# Patient Record
Sex: MALE | Race: WHITE | Employment: UNEMPLOYED | URBAN - METROPOLITAN AREA
[De-identification: names, ages, dates, MRNs, and addresses within clinical notes are randomized per-mention and may not be internally consistent; named-entity substitution may affect disease eponyms.]

---

## 2022-04-13 ENCOUNTER — APPOINTMENT (OUTPATIENT)
Dept: CT IMAGING | Age: 32
End: 2022-04-13
Payer: COMMERCIAL

## 2022-04-13 ENCOUNTER — HOSPITAL ENCOUNTER (EMERGENCY)
Age: 32
Discharge: HOME | End: 2022-04-13
Attending: EMERGENCY MEDICINE
Payer: COMMERCIAL

## 2022-04-13 ENCOUNTER — APPOINTMENT (OUTPATIENT)
Dept: GENERAL RADIOLOGY | Age: 32
End: 2022-04-13
Payer: COMMERCIAL

## 2022-04-13 VITALS
HEIGHT: 67 IN | RESPIRATION RATE: 20 BRPM | OXYGEN SATURATION: 98 % | WEIGHT: 175 LBS | DIASTOLIC BLOOD PRESSURE: 64 MMHG | BODY MASS INDEX: 27.47 KG/M2 | SYSTOLIC BLOOD PRESSURE: 123 MMHG | HEART RATE: 55 BPM | TEMPERATURE: 98.3 F

## 2022-04-13 DIAGNOSIS — G40.919 BREAKTHROUGH SEIZURE (HCC): ICD-10-CM

## 2022-04-13 DIAGNOSIS — R56.9 SEIZURE (HCC): Primary | ICD-10-CM

## 2022-04-13 LAB
ALBUMIN SERPL-MCNC: 4.5 G/DL (ref 3.4–5)
ALP BLD-CCNC: 94 U/L (ref 40–129)
ALT SERPL-CCNC: 17 U/L (ref 10–40)
AMPHETAMINE SCREEN, URINE: ABNORMAL
AST SERPL-CCNC: 17 U/L (ref 15–37)
BACTERIA: ABNORMAL /HPF
BARBITURATE SCREEN URINE: ABNORMAL
BASOPHILS ABSOLUTE: 0.1 K/UL (ref 0–0.2)
BASOPHILS RELATIVE PERCENT: 0.8 %
BENZODIAZEPINE SCREEN, URINE: POSITIVE
BILIRUB SERPL-MCNC: 0.4 MG/DL (ref 0–1)
BILIRUBIN DIRECT: <0.2 MG/DL (ref 0–0.3)
BILIRUBIN URINE: NEGATIVE
BILIRUBIN, INDIRECT: NORMAL MG/DL (ref 0–1)
BLOOD, URINE: NEGATIVE
CANNABINOID SCREEN URINE: POSITIVE
CLARITY: CLEAR
COCAINE METABOLITE SCREEN URINE: ABNORMAL
COLOR: YELLOW
EOSINOPHILS ABSOLUTE: 0.6 K/UL (ref 0–0.6)
EOSINOPHILS RELATIVE PERCENT: 7.1 %
EPITHELIAL CELLS, UA: ABNORMAL /HPF (ref 0–5)
GLUCOSE URINE: NEGATIVE MG/DL
HCT VFR BLD CALC: 44.1 % (ref 40.5–52.5)
HEMOGLOBIN: 15.5 G/DL (ref 13.5–17.5)
KETONES, URINE: NEGATIVE MG/DL
LEUKOCYTE ESTERASE, URINE: NEGATIVE
LYMPHOCYTES ABSOLUTE: 2.8 K/UL (ref 1–5.1)
LYMPHOCYTES RELATIVE PERCENT: 31.3 %
Lab: ABNORMAL
MCH RBC QN AUTO: 30.5 PG (ref 26–34)
MCHC RBC AUTO-ENTMCNC: 35.1 G/DL (ref 31–36)
MCV RBC AUTO: 86.9 FL (ref 80–100)
METHADONE SCREEN, URINE: ABNORMAL
MICROSCOPIC EXAMINATION: ABNORMAL
MONOCYTES ABSOLUTE: 0.5 K/UL (ref 0–1.3)
MONOCYTES RELATIVE PERCENT: 5.7 %
MUCUS: ABNORMAL /LPF
NEUTROPHILS ABSOLUTE: 4.9 K/UL (ref 1.7–7.7)
NEUTROPHILS RELATIVE PERCENT: 55.1 %
NITRITE, URINE: NEGATIVE
OPIATE SCREEN URINE: ABNORMAL
OXYCODONE URINE: ABNORMAL
PDW BLD-RTO: 12.7 % (ref 12.4–15.4)
PH UA: 7
PH UA: 7 (ref 5–8)
PHENCYCLIDINE SCREEN URINE: ABNORMAL
PLATELET # BLD: 224 K/UL (ref 135–450)
PMV BLD AUTO: 7.8 FL (ref 5–10.5)
PROPOXYPHENE SCREEN: ABNORMAL
PROTEIN UA: NEGATIVE MG/DL
RBC # BLD: 5.07 M/UL (ref 4.2–5.9)
RBC UA: ABNORMAL /HPF (ref 0–4)
SPECIFIC GRAVITY UA: 1.02 (ref 1–1.03)
TOTAL PROTEIN: 7.4 G/DL (ref 6.4–8.2)
TROPONIN: <0.01 NG/ML
URINE TYPE: ABNORMAL
UROBILINOGEN, URINE: 1 E.U./DL
WBC # BLD: 9 K/UL (ref 4–11)
WBC UA: ABNORMAL /HPF (ref 0–5)

## 2022-04-13 PROCEDURE — 70450 CT HEAD/BRAIN W/O DYE: CPT

## 2022-04-13 PROCEDURE — 71045 X-RAY EXAM CHEST 1 VIEW: CPT

## 2022-04-13 PROCEDURE — 84484 ASSAY OF TROPONIN QUANT: CPT

## 2022-04-13 PROCEDURE — 99285 EMERGENCY DEPT VISIT HI MDM: CPT

## 2022-04-13 PROCEDURE — 80307 DRUG TEST PRSMV CHEM ANLYZR: CPT

## 2022-04-13 PROCEDURE — 80076 HEPATIC FUNCTION PANEL: CPT

## 2022-04-13 PROCEDURE — 80048 BASIC METABOLIC PNL TOTAL CA: CPT

## 2022-04-13 PROCEDURE — 85025 COMPLETE CBC W/AUTO DIFF WBC: CPT

## 2022-04-13 PROCEDURE — 36415 COLL VENOUS BLD VENIPUNCTURE: CPT

## 2022-04-13 PROCEDURE — 81001 URINALYSIS AUTO W/SCOPE: CPT

## 2022-04-13 PROCEDURE — 6370000000 HC RX 637 (ALT 250 FOR IP): Performed by: EMERGENCY MEDICINE

## 2022-04-13 RX ORDER — LORAZEPAM 0.5 MG/1
0.5 TABLET ORAL 2 TIMES DAILY
Qty: 6 TABLET | Refills: 0 | Status: SHIPPED | OUTPATIENT
Start: 2022-04-13 | End: 2022-04-16

## 2022-04-13 RX ORDER — LORAZEPAM 0.5 MG/1
0.5 TABLET ORAL ONCE
Status: COMPLETED | OUTPATIENT
Start: 2022-04-13 | End: 2022-04-13

## 2022-04-13 RX ADMIN — LORAZEPAM 0.5 MG: 0.5 TABLET ORAL at 19:11

## 2022-04-13 ASSESSMENT — PAIN - FUNCTIONAL ASSESSMENT: PAIN_FUNCTIONAL_ASSESSMENT: 0-10

## 2022-04-13 ASSESSMENT — PAIN DESCRIPTION - ORIENTATION: ORIENTATION: LEFT

## 2022-04-13 ASSESSMENT — PAIN DESCRIPTION - LOCATION: LOCATION: SHOULDER

## 2022-04-13 ASSESSMENT — PAIN DESCRIPTION - FREQUENCY: FREQUENCY: INTERMITTENT

## 2022-04-13 ASSESSMENT — PAIN SCALES - GENERAL: PAINLEVEL_OUTOF10: 3

## 2022-04-13 NOTE — ED PROVIDER NOTES
1 [facility]  EMERGENCY DEPARTMENT ENCOUNTER          ATTENDING PHYSICIAN NOTE       Date of evaluation: 4/13/2022    Chief Complaint     Seizures      History of Present Illness     Eric Tran is a 32 y.o. adult who presents emergency department after having 2 seizures. According to a significant other who is presenting with the patient the patient has a prior history of epilepsy and the patient is notably on Trileptal and Lamictal for treatment of seizures. The patient reportedly had a seizure last night which was a generalized tonic-clonic seizure and then had another seizure this morning which the significant other describes as a staring spell. The difference from these particular seizures as compared to the patient's usual seizures is at the patient has some significant retrograde amnesia and is stating that he does not remember anything after the year 2007. The significant other noted that the patient's mental status did return back to normal before his additional seizure earlier this morning. They present because \"they\" are somewhat more confused than is typical after a seizure. They note some issues with sleep recently and also note that the patient was recently started on one of his 2 antiseizure medications. They are not sure which medication was new. They do not know of any missed doses of medications. They deny fevers chills nausea vomiting, abdominal pain, cough, or any other new concerning symptoms. Review of Systems     As documented in the HPI, otherwise all other systems were reviewed and were negative. Past Medical, Surgical, Family, and Social History     Eric Tran has no past medical history on file. Eric Tran has no past surgical history on file. Lucho Murguia family history is not on file. Eric Tran reports that Eric Tran has never smoked. Eric Tran has never used smokeless tobacco. Eric Tran reports current drug use.  Drug: Marijuana Gloria Garcia). Canelo Ceballos reports that Canelo Ceballos does not drink alcohol. Medications     Previous Medications    No medications on file       Allergies     Canelo Ceballos has No Known Allergies. Physical Exam     INITIAL VITALS: BP: (!) 145/71, Temp: 97.9 °F (36.6 °C), Pulse: 53, Resp: 16, SpO2: 96 %   General: 32year-old biologically male adult sitting in bed in no apparent cardiorespiratory distress  HEENT:  head is atraumatic, pupils equal round and reactive to light, sclera are clear, oropharynx is nonerythematous  Neck: supple, no lymphadenopathy  Chest: nonlabored respirations, equal chest rise bilaterally, no accessory muscle use  Cardiovascular: Regular, rate, and rhythm, 2+ radial pulses bilaterally, capillary refill 2 seconds  Abdominal: Soft, nontender, nondistended, positive bowel sounds throughout, no rebound or guarding  Skin: Warm, dry well perfused, no rashes  Musculoskeletal: no obvious deformities, no tenderness to palpation diffusely  Neurologic: Alert and oriented to his name, situation, place but not the year. Patient has 5-5 strength in bilateral upper and lower extremities throughout, sensation intact light touch in bilateral upper and lower extremities throughout, cranial nerves II through XII are intact    Diagnostic Results     RADIOLOGY:  CT HEAD WO CONTRAST   Final Result      1. No acute intracranial process. XR CHEST PORTABLE   Final Result      No acute cardiopulmonary findings.               LABS:   Results for orders placed or performed during the hospital encounter of 04/13/22   CBC with Auto Differential   Result Value Ref Range    WBC 9.0 4.0 - 11.0 K/uL    RBC 5.07 4.20 - 5.90 M/uL    Hemoglobin 15.5 13.5 - 17.5 g/dL    Hematocrit 44.1 40.5 - 52.5 %    MCV 86.9 80.0 - 100.0 fL    MCH 30.5 26.0 - 34.0 pg    MCHC 35.1 31.0 - 36.0 g/dL    RDW 12.7 12.4 - 15.4 %    Platelets 496 120 - 103 K/uL    MPV 7.8 5.0 - 10.5 fL    Neutrophils % 55.1 %    Lymphocytes % 31.3 % Monocytes % 5.7 %    Eosinophils % 7.1 %    Basophils % 0.8 %    Neutrophils Absolute 4.9 1.7 - 7.7 K/uL    Lymphocytes Absolute 2.8 1.0 - 5.1 K/uL    Monocytes Absolute 0.5 0.0 - 1.3 K/uL    Eosinophils Absolute 0.6 0.0 - 0.6 K/uL    Basophils Absolute 0.1 0.0 - 0.2 K/uL   Basic Metabolic Panel w/ Reflex to MG   Result Value Ref Range    Sodium 142 136 - 145 mmol/L    Chloride 103 99 - 110 mmol/L    CO2 26 21 - 32 mmol/L    Anion Gap 13 3 - 16    Glucose 93 70 - 99 mg/dL    BUN 13 7 - 20 mg/dL    CREATININE 0.9 0.9 - 1.3 mg/dL    GFR Non-African American >60 >60    GFR African American >60 >60    Calcium 10.0 8.3 - 10.6 mg/dL   Troponin   Result Value Ref Range    Troponin <0.01 <0.01 ng/mL   Urinalysis with Microscopic   Result Value Ref Range    Color, UA Yellow Straw/Yellow    Clarity, UA Clear Clear    Glucose, Ur Negative Negative mg/dL    Bilirubin Urine Negative Negative    Ketones, Urine Negative Negative mg/dL    Specific Gravity, UA 1.020 1.005 - 1.030    Blood, Urine Negative Negative    pH, UA 7.0 5.0 - 8.0    Protein, UA Negative Negative mg/dL    Urobilinogen, Urine 1.0 <2.0 E.U./dL    Nitrite, Urine Negative Negative    Leukocyte Esterase, Urine Negative Negative    Microscopic Examination Not Indicated     Urine Type Voided    Urine Drug Screen   Result Value Ref Range    pH, UA 7.0     Drug Screen Comment: see below    Hepatic Function Panel   Result Value Ref Range    Total Protein 7.4 6.4 - 8.2 g/dL    Albumin 4.5 3.4 - 5.0 g/dL    Alkaline Phosphatase 94 40 - 129 U/L    ALT 17 10 - 40 U/L    AST 17 15 - 37 U/L    Total Bilirubin 0.4 0.0 - 1.0 mg/dL    Bilirubin, Direct <0.2 0.0 - 0.3 mg/dL    Bilirubin, Indirect see below 0.0 - 1.0 mg/dL     RECENT VITALS:  BP: 129/62, Temp: 98 °F (36.7 °C), Pulse: 51, Resp: 20, SpO2: 97 %       ED Course     Nursing Notes, Past Medical Hx, Past Surgical Hx, Social Hx, Allergies, and Family Hx were reviewed.     The patient was given the following medications:  Orders Placed This Encounter   Medications    LORazepam (ATIVAN) tablet 0.5 mg    LORazepam (ATIVAN) 0.5 MG tablet     Sig: Take 1 tablet by mouth 2 times daily for 3 days. Dispense:  6 tablet     Refill:  0       CONSULTS:  None    MEDICAL DECISION MAKING / ASSESSMENT / PLAN     Emily German is a 32 y.o. adult who has a known history of a seizure disorder since the age of 16. Presents with 2 seizures over the past 24 hours which represents a slight increase in frequency and also noted that the seizures are associated with amnesia and longer lasting postictal confusion as compared to baseline seizures. No reports of recent illnesses but have reported a new seizure medication as well as some recent difficulty with sleeping. Given the patient's persistent alteration of mental status decision was made to perform diagnostic evaluation. The patient's lab work and imaging has resulted back showing no evidence of any abnormalities. Urine showed no signs of infection CBC normal basic metabolic profile normal chest x-ray and a CT of the head were both normal.  Overall I feel the patient is safe for outpatient follow-up with his primary neurologist.  He was given a dose of 0.5 mg of Ativan will be given an Ativan bridge. Clinical Impression     1. Seizure ([address]    2. Breakthrough seizure [facility])        Disposition     PATIENT REFERRED TO:  [facility] Emergency Department  [address] [address]  [phone]          DISCHARGE MEDICATIONS:  New Prescriptions    LORAZEPAM (ATIVAN) 0.5 MG TABLET    Take 1 tablet by mouth 2 times daily for 3 days.        DISPOSITION Decision To Discharge 04/13/2022 08:53:30 PM         Jorge Dutton MD  04/13/22 2094

## 2022-04-13 NOTE — ED NOTES
Received report from LATISHA [facility] AT [place]. Assumed care of patient at this time.       Deloris Haile RN  04/13/22 0181

## 2022-04-13 NOTE — ED TRIAGE NOTES
Pt came to ED for seizure yesterday and altered mental status. PT is oriented to only self. Pt cannot recall past events.

## 2022-04-14 LAB
ANION GAP SERPL CALCULATED.3IONS-SCNC: 13 MMOL/L (ref 3–16)
BUN BLDV-MCNC: 13 MG/DL (ref 7–20)
CALCIUM SERPL-MCNC: 10 MG/DL (ref 8.3–10.6)
CHLORIDE BLD-SCNC: 103 MMOL/L (ref 99–110)
CO2: 26 MMOL/L (ref 21–32)
CREAT SERPL-MCNC: 0.9 MG/DL (ref 0.9–1.3)
GFR AFRICAN AMERICAN: >60
GFR NON-AFRICAN AMERICAN: >60
GLUCOSE BLD-MCNC: 93 MG/DL (ref 70–99)
POTASSIUM REFLEX MAGNESIUM: 4 MMOL/L (ref 3.5–5.1)
SODIUM BLD-SCNC: 142 MMOL/L (ref 136–145)

## 2023-01-27 ENCOUNTER — FOLLOWUP TELEPHONE ENCOUNTER (OUTPATIENT)
Dept: PSYCHIATRY | Age: 33
End: 2023-01-27

## 2023-01-30 ENCOUNTER — HOSPITAL ENCOUNTER (OUTPATIENT)
Dept: PSYCHIATRY | Age: 33
Setting detail: THERAPIES SERIES
Discharge: HOME | End: 2023-01-30

## 2023-01-30 RX ORDER — PROPRANOLOL HYDROCHLORIDE 10 MG/1
10 TABLET ORAL 3 TIMES DAILY
COMMUNITY

## 2023-01-30 RX ORDER — FLUOXETINE HYDROCHLORIDE 20 MG/1
20 CAPSULE ORAL DAILY
COMMUNITY

## 2023-01-30 RX ORDER — CLONAZEPAM 1 MG/1
1 TABLET ORAL 2 TIMES DAILY PRN
COMMUNITY

## 2023-01-30 RX ORDER — LAMOTRIGINE 100 MG/1
125 TABLET ORAL DAILY
COMMUNITY

## 2023-01-30 RX ORDER — PANTOPRAZOLE SODIUM 40 MG/1
40 TABLET, DELAYED RELEASE ORAL DAILY
COMMUNITY

## 2023-01-30 RX ORDER — PRAZOSIN HYDROCHLORIDE 1 MG/1
1 CAPSULE ORAL NIGHTLY
COMMUNITY

## 2023-01-30 ASSESSMENT — PATIENT HEALTH QUESTIONNAIRE - PHQ9: SUM OF ALL RESPONSES TO PHQ QUESTIONS 1-9: 18

## 2023-01-30 ASSESSMENT — ANXIETY QUESTIONNAIRES
1. FEELING NERVOUS, ANXIOUS, OR ON EDGE: 3
6. BECOMING EASILY ANNOYED OR IRRITABLE: 1
GAD7 TOTAL SCORE: 16
4. TROUBLE RELAXING: 3
7. FEELING AFRAID AS IF SOMETHING AWFUL MIGHT HAPPEN: 1
2. NOT BEING ABLE TO STOP OR CONTROL WORRYING: 3
5. BEING SO RESTLESS THAT IT IS HARD TO SIT STILL: 2
3. WORRYING TOO MUCH ABOUT DIFFERENT THINGS: 3

## 2023-01-30 ASSESSMENT — SLEEP AND FATIGUE QUESTIONNAIRES
DO YOU HAVE DIFFICULTY SLEEPING: YES
AVERAGE NUMBER OF SLEEP HOURS: 4
DO YOU USE A SLEEP AID: NO
SLEEP PATTERN: DIFFICULTY FALLING ASLEEP;DISTURBED/INTERRUPTED SLEEP;NIGHTMARES/TERRORS;RESTLESSNESS
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
## 2023-02-02 ENCOUNTER — HOSPITAL ENCOUNTER (OUTPATIENT)
Dept: PSYCHIATRY | Age: 33
Setting detail: THERAPIES SERIES
Discharge: HOME | End: 2023-02-02
Payer: MEDICARE

## 2023-02-02 VITALS
HEART RATE: 63 BPM | RESPIRATION RATE: 16 BRPM | TEMPERATURE: 97.8 F | DIASTOLIC BLOOD PRESSURE: 69 MMHG | SYSTOLIC BLOOD PRESSURE: 114 MMHG

## 2023-02-02 PROBLEM — F43.10 PTSD (POST-TRAUMATIC STRESS DISORDER): Status: ACTIVE | Noted: 2023-02-02

## 2023-02-02 PROBLEM — F39 MOOD DISORDER (HCC): Status: ACTIVE | Noted: 2023-02-02

## 2023-02-02 PROBLEM — F41.1 GAD (GENERALIZED ANXIETY DISORDER): Status: ACTIVE | Noted: 2023-02-02

## 2023-02-02 PROCEDURE — H2020 THER BEHAV SVC, PER DIEM: HCPCS

## 2023-02-02 PROCEDURE — 90792 PSYCH DIAG EVAL W/MED SRVCS: CPT | Performed by: PSYCHIATRY & NEUROLOGY

## 2023-02-02 NOTE — GROUP NOTE
Group Therapy Note    Date: 2/2/2023    Group Start Time:  1:15 PM  Group End Time:  2:15 PM  Group Topic: Recreational    [facility]    Neil Nix        Group Therapy Note    Group members engaged in collaborative social intervention utilizing conversation ball to answer reflective questions based in affirmation and acknowledgment of strengths. Attendees: 6       Notes:  Pt present and actively engaged across interventions, appropriately social and collaborative with peers throughout. No needs verbalized at conclusion of session. Status After Intervention:  Improved    Participation Level:  Active Listener and Interactive    Participation Quality: Attentive, Sharing, and Supportive      Speech:  normal      Thought Process/Content: Logical      Affective Functioning: Congruent      Mood: euthymic      Level of consciousness:  Alert and Attentive      Response to Learning: Progressing to goal      Endings: None Reported    Modes of Intervention: Support, Socialization, Exploration, Clarifying, and Problem-solving      Discipline Responsible: Psychoeducational Specialist      Signature:  Ernestina Starks MM, MT-BC

## 2023-02-02 NOTE — GROUP NOTE
Group Therapy Note    Date: 2/2/2023    Group Start Time: 10:00 AM  Group End Time: 10:45 AM  Group Topic: Psychoeducation    NOMANZ REGINA Singer        Group Therapy Note    Attendees: 6    Facilitator led a group discussion on impulse control and resisting urges. Patients reflected on what the outcomes are from acting on urges and impulses. Patient were given information handouts on ways to manage impulses and the group discussed what impulse control looks like for them individually. Patient's Goal:  Patient will understand what impulse control is and be shree to identify ways to manage urges. Notes:  Patient engaged in group discussion and expressed understanding on what impulse control is and how to manage urges. Status After Intervention:  Improved    Participation Level:  Active Listener and Interactive    Participation Quality: Appropriate and Attentive      Speech:  normal      Thought Process/Content: Logical  Linear      Affective Functioning: Congruent      Mood: euthymic      Level of consciousness:  Alert, Oriented x4, and Attentive      Response to Learning: Able to verbalize current knowledge/experience, Able to verbalize/acknowledge new learning, Able to change behavior, and Progressing to goal      Endings: None Reported    Modes of Intervention: Education and Problem-solving      Discipline Responsible: /Counselor      Signature:  SYED Solo

## 2023-02-02 NOTE — GROUP NOTE
Group Therapy Note    Date: 2/2/2023    Group Start Time:  8:30 AM  Group End Time:  9:45 AM  Group Topic: Psychotherapy    [facility]    Neil Howard        Group Therapy Note    Group members will engage in group psychotherapy session to explore thoughts/feelings/behaviors and their impact on the self, relationships, and engagement with surroundings. Attendees: 5       Notes:  During introductions to session, Toribio Aranda shared with peers that his purpose for engagement in IOP is to develop coping skills for \"all the things in life\". Toribio Aranda was provided brief description of psychotherapy structure from peers, invited to utilize sessions to explore these situational stressors in a safe controlled environment. He acknowledged understanding. During session Toribio Aranda spoke up in response to a peer, speaking at the same time as peer was finishing their statement. When prompted to repeat himself, Toribio Aranda said \"No, I didn't want to say anything. \" Toribio Aranda then closed eyes and was silent with eyes closed across remainder of session.     Status After Intervention:  Unchanged    Participation Level: Minimal    Participation Quality: Resistant and Lethargic      Speech:  hesitant      Thought Process/Content: Linear      Affective Functioning: Blunted      Mood: depressed      Level of consciousness:  Inattentive      Response to Learning: Resistant      Endings: None Reported    Modes of Intervention: Support, Socialization, Exploration, Clarifying, and Problem-solving      Discipline Responsible: Psychoeducational Specialist      Signature:  La Nena Meng MM, MT-BC

## 2023-02-02 NOTE — GROUP NOTE
Group Therapy Note    Date: 2/2/2023    Group Start Time: 11:15 AM  Group End Time: 12:15 PM  Group Topic: Psychoeducation    [facility]    CYNDI Barfield        Group Therapy Note  Clinician introduced smart goals. Group members identified their overarching goal and the clinician taught the group breaking down their overarching goals to many specific smart goals. The members were able to work it down and prioritize smaller goals to one goal to start with. Attendees: 6       Patient's Goal:      Notes:  Patient was quiet and had their head down for most of the group. They was tapping on their forehead through out the group as if they was self-soothing. Patient did take notes and spoke to the clinician on the way out of group. They reported having a headache and all of the talking was difficult for them. Patient attended [facility] today as their first day. Clinician let him know that the first few times, is exhausting and difficult and to just let the clinicians know if they need anything, that they do not have to suffer quietly.       Status After Intervention:  Unchanged    Participation Level: Minimal    Participation Quality: Appropriate and Resistant      Speech:  normal      Thought Process/Content: Linear      Affective Functioning: Constricted/Restricted      Mood: anxious and fearful      Level of consciousness:  Attentive      Response to Learning: Able to retain information      Endings: None Reported    Modes of Intervention: Education, Support, and Activity      Discipline Responsible: /Counselor      Signature:  CYNDI Barfield
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
## 2023-02-07 ENCOUNTER — HOSPITAL ENCOUNTER (OUTPATIENT)
Dept: PSYCHIATRY | Age: 33
Setting detail: THERAPIES SERIES
Discharge: HOME | End: 2023-02-07
Payer: MEDICARE

## 2023-02-07 DIAGNOSIS — F39 MOOD DISORDER (HCC): Primary | ICD-10-CM

## 2023-02-07 PROCEDURE — H2020 THER BEHAV SVC, PER DIEM: HCPCS

## 2023-02-07 NOTE — GROUP NOTE
Group Therapy Note    Date: 2/7/2023    Group Start Time: 10:00 AM  Group End Time: 11:00 AM  Group Topic: Psychoeducation    NOMANZ REGINA Gomez, CYNDI        Group Therapy Note  Clinician facilitated a group for self-sabotaging behaviors and how the group members are enabling each others self-sabotaging behaviors. Clinician had the group members define self-sabotage and enabling. Members identified they discussed learning the coping skills here and not applying them when they leave here. Clinician gave the members 5 minutes to contemplate the things they said that the clinician wrote on the white board. They wrote down the behaviors they have decided they were going to just keep doing and shared them with the group. Group members supported each other and helped 2 members work out a problem on the [employer]. Attendees: 7       Patient's Goal:      Notes:  Patient actively listened to the clinician about the excuses the group was making prior to the review of psychotherapy. Patient identified, they as a group have been learning skills here and not applying them when they leave here and self-sabotaging their progress. Patient also identified that they have been enabling self-sabotaging behaviors of other group members. Patient wrote that he has not been using the program to his benefit fully. He did shared that the breathing techniques are the 1 thing that he has been applying and it is helping tremendously. Status After Intervention:  Improved    Participation Level:  Active Listener and Interactive    Participation Quality: Appropriate, Attentive, Sharing, and Supportive      Speech:  normal      Thought Process/Content: Delusional      Affective Functioning: Constricted/Restricted      Mood: anxious      Level of consciousness:  Preoccupied      Response to Learning: Capable of insight      Endings: None Reported    Modes of Intervention: Education, Support, Exploration, Activity, Confrontation, and Limit-setting      Discipline Responsible: /Counselor      Signature:  CYNDI Arce

## 2023-02-07 NOTE — GROUP NOTE
Group Therapy Note    Date: 2/7/2023    Group Start Time:  8:30 AM  Group End Time:  9:45 AM  Group Topic: Psychotherapy    NOMANZ REGINA Howard        Group Therapy Note    Group members will utilize structure of psychotherapy dialogue to explore thoughts/feelings/behaviors and their impacts on self, relationships, and engagement with surroundings. Attendees: 7     Notes:  During session, Ludin Dean reported that he is still experiencing withdrawal symptoms as he detox hisself from benzos. He endorses visual and auditory hallucinations, is challenged by sensory processing disturbances. He was attentive to peers across session, minimally reflective. Status After Intervention:  Unchanged    Participation Level:  Active Listener and Minimal    Participation Quality: Attentive      Speech:  pressured      Thought Process/Content: Hallucinating  Perseverating      Affective Functioning: Exaggerated      Mood: elevated      Level of consciousness:  Alert and Preoccupied      Response to Learning: Capable of insight and Progressing to goal      Endings: None Reported    Modes of Intervention: Support, Socialization, Exploration, and Problem-solving      Discipline Responsible: Psychoeducational Specialist      Signature:  Harmony Carrington MM, MT-BC

## 2023-02-07 NOTE — GROUP NOTE
Group Therapy Note    Date: 2/7/2023    Group Start Time:  1:15 PM  Group End Time:  2:15 PM  Group Topic: Recreational    [facility]    Neil Carlx        Group Therapy Note    Topic: Narrative therapy techniques    Mode of Intervention: Creative Writing round - Group members engaged in collaborative storytelling intervention. Facilitator provided patients with sentence stems to begin improvised writing. Group members were given 4 minutes to write then instructed to pass their story to the person on theright. This process continued until 5 members had contributed to each story. Group concluded with members sharing the completed stories, processing the role of creative expression in leisure and recreation, foundations of narrative therapy techniques. Attendees: 7       Notes:  Present and actively engaged across interventions. No needs verbalized at conclusion of session. Status After Intervention:  Improved    Participation Level:  Active Listener and Interactive    Participation Quality: Sharing and Supportive      Speech:  normal      Thought Process/Content: Logical      Affective Functioning: Congruent      Mood: euthymic      Level of consciousness:  Alert and Oriented x4      Response to Learning: Capable of insight and Progressing to goal      Endings: None Reported    Modes of Intervention: Support, Socialization, Exploration, Activity, and Media      Discipline Responsible: Psychoeducational Specialist      Signature:  Dianna Riley, MM, MT-BC

## 2023-02-07 NOTE — GROUP NOTE
Group Therapy Note    Date: 2/7/2023    Group Start Time: 11:15 AM  Group End Time: 12:15 PM  Group Topic: Psychoeducation    [facility]    [facility]        Group Therapy Note    Attendees: 7    Facilitator led a group discussion on realigning actions and behaviors to meet identified goals. Patients explored \"all possible outcomes\" for a chosen behavior that has been obstructing progress towards goals. After exploring what good or bad can come from indulging or suppressing the selected behaviors, the group members discussed and processed what was needed to help promote action towards goals and ways to overcome perceived obstacles. Notes:  Patient expressed feeling unwell in group. Patient reported \"the words are vibrating\" and stated that they forgot to take medication that morning. Patient was worried that they would start having heart palpitations without their medications. Facilitator directed patient to the intake nurse for vitals. Patient returned to group towards the end and observed discussion. Status After Intervention:  Decompensated    Participation Level:  Active Listener    Participation Quality: Inappropriate      Speech:  slurred      Thought Process/Content: Hallucinating      Affective Functioning: Exaggerated      Mood: anxious      Level of consciousness:  Alert, Oriented x4, and Attentive      Response to Learning: Capable of insight and Able to change behavior      Endings: None Reported    Modes of Intervention: Exploration and Problem-solving      Discipline Responsible: /Counselor      Signature:  [facility]

## 2023-02-09 ENCOUNTER — HOSPITAL ENCOUNTER (OUTPATIENT)
Dept: PSYCHIATRY | Age: 33
Setting detail: THERAPIES SERIES
Discharge: HOME | End: 2023-02-09
Payer: MEDICARE

## 2023-02-09 VITALS
RESPIRATION RATE: 16 BRPM | OXYGEN SATURATION: 98 % | HEART RATE: 76 BPM | TEMPERATURE: 97.6 F | DIASTOLIC BLOOD PRESSURE: 75 MMHG | SYSTOLIC BLOOD PRESSURE: 128 MMHG

## 2023-02-09 DIAGNOSIS — F39 MOOD DISORDER (HCC): Primary | ICD-10-CM

## 2023-02-09 PROCEDURE — H2020 THER BEHAV SVC, PER DIEM: HCPCS

## 2023-02-09 PROCEDURE — 99215 OFFICE O/P EST HI 40 MIN: CPT | Performed by: PSYCHIATRY & NEUROLOGY

## 2023-02-09 NOTE — GROUP NOTE
Group Therapy Note    Date: 2/9/2023    Group Start Time: 10:00 AM  Group End Time: 11:00 AM  Group Topic: Healthy Living/Wellness    [facility]    Zayda Singer        Group Therapy Note    Attendees: 7    Facilitator led a guided imagery/progressive muscle relaxation exercise. Patients were instructed to get into a comfortable position, either sitting in a chair or on by laying on a provided yoga mat. Facilitator initiated group by setting relaxing instrumental music, dimming the lights, and guiding the group members through some intentional breathing. Facilitator then walked patients thought a PRM script, focused on creating and releasing tension in different muscle groups. Following PMR, facilitator used a  self-esteem building script to direct a guided imagery exercise. Patient's Goal:  Patient will be able to engage in presented activities and will be able to use the imagination to relax, be present in the here-and-now, and build self-esteem. Notes:  Patient participated fully in activity by comfortably sitting in the chair and listening attentively to the scripting. Patient reported that the experience was relaxing but that there was some struggle with believing the positive affirmations. Status After Intervention:  Improved    Participation Level:  Active Listener    Participation Quality: Appropriate and Attentive      Speech:  WNL    Thought Process/Content: Logical  Linear      Affective Functioning: Congruent      Mood: euthymic      Level of consciousness:  Alert, Oriented x4, and Attentive      Response to Learning: Capable of insight, Able to change behavior, and Progressing to goal      Endings: None Reported    Modes of Intervention: Activity and support      Discipline Responsible: /Counselor      Signature:  Lavone Felty, LSW

## 2023-02-09 NOTE — GROUP NOTE
Group Therapy Note    Date: 2/9/2023    Group Start Time: 11:15 AM  Group End Time: 12:15 PM  Group Topic: Psychoeducation    [facility] [place] [facility] [place]ND[place]        Group Therapy Note  Clinician facilitated an overview of re-parenting. The members were able to take notes defining key words and concepts with reparenting the inner child. Clinician went over the ages and stages and how trauma effects growth stunting it and a person getting stuck in the ages, stages of development. Attendees: 7       Patient's Goal:      Notes:  Patient was cooperative and fully engaged in the group discussion and activity. Patient was able to verbalize acquired knowledge with the ages and stages from his studies in psychology in college. Defining reparenting, and trauma that occurs in a certain age/stage, he reported it made a lot of sense with the understanding of the age/stage. He discussed with the group and provided insight to the discussion. Status After Intervention:  Improved    Participation Level:  Active Listener and Interactive    Participation Quality: Appropriate, Attentive, Sharing, and Supportive      Speech:  normal      Thought Process/Content: Logical      Affective Functioning: Congruent      Mood: anxious      Level of consciousness:  Oriented x4      Response to Learning: Able to verbalize/acknowledge new learning      Endings: None Reported    Modes of Intervention: Education, Support, Exploration, and Activity      Discipline Responsible: /Counselor      Signature:  CYNDI Moralez

## 2023-02-09 NOTE — GROUP NOTE
Group Therapy Note    Date: 2/9/2023    Group Start Time:  1:15 PM  Group End Time:  2:15 PM  Group Topic: Music Therapy    [facility]    Neil Carlx        Group Therapy Note    Mode of Intervention: Music Therapy    Intervention Set: Sharilyn Standing members selected 3 songs eliciting relaxation, excitement and happiness. Facilitator played songs at random while members discussed each piece and identified which group member each song belonged to. Activity was followed by collaborative process of music as a tool for self-expression and recovery. Attendees: 7       Notes:  Present and active engagement across session. No needs verbalized at conclusion. Status After Intervention:  Improved    Participation Level:  Active Listener and Interactive    Participation Quality: Appropriate, Sharing, and Supportive      Speech:  normal      Thought Process/Content: Logical      Affective Functioning: Congruent      Mood: euthymic      Level of consciousness:  Alert and Attentive      Response to Learning: Capable of insight and Progressing to goal      Endings: None Reported    Modes of Intervention: Support, Socialization, Exploration, Activity, and Media      Discipline Responsible: Psychoeducational Specialist      Signature:  Erika Branch MM, MT-BC

## 2023-02-10 NOTE — PROGRESS NOTES
Department of Psychiatry  Progress Note    Patient's chart was reviewed. Discussed with treatment team. Met with patient. SUBJECTIVE:      Has been off clonazepam now for seven and days and feels manic. Says he has more energy and focus. Does not present manic. He presents euthymic. Discussed at length - that it'll take time for his brain/mind to get used to being off benzos. No thoughts of self-harm or suicide. ROS:   Patient has new complaints: yes - see above  Sleeping adequately:  Yes   Appetite adequate: has lost some weight  Engaged in programming: Yes    OBJECTIVE:  VITALS:  /75   Pulse 76   Temp 97.6 °F (36.4 °C) (Temporal)   Resp 16   SpO2 98%     Mental Status Examination:    Appearance: fair grooming and hygiene  Behavior/Attitude toward examiner:  cooperative, attentive and fair eye contact  Speech: Normal rate, volume, amount  Mood:  \"ok\"  Affect:  mood congruent     Thought processes:  Goal directed, linear, no CRISTY or gross disorganization  Thought Content: no SI, no HI, no delusions voiced, no obsessions  Perceptions: no AVH  Attention: attention span and concentration were intact to interview   Abstraction: intact  Cognition:  Alert and oriented to person, place, time, and situation, recall intact  Insight: fair  Judgment: intact    Medication:  Prozac 60 mg daily, lamotrigine 125 mg twice daily,  propranolol 10 mg three times daily, prazosin 1 mg nightly, metoprolol  50 mg twice daily, and Protonix 40 mg daily. FORMULATION:  This is a domiciled, never , psychiatrically  disabled 80-year-old with a history of multiple psychiatric diagnoses,  who self-presented to our intensive outpatient program with worsening  symptoms of depression and PTSD. They meet criteria for mood disorder,  PTSD and general anxiety disorder.   Given the severity of his symptoms  and past history including multiple previous suicide attempts and  hospitalizations, they were admitted to our intensive outpatient program  for further stabilization. Diagnoses:  Mood disorder  PTSD  COLTEN  Cluster b traits     PLAN:  1. Admitted to our intensive outpatient program for further stabilization and treatment. 2.  On admission, continued outpatient medication regimen as prescribed to give them a  chance to engage in our program.    2/9/2023 - doing ok. Has been off clonazepam now for seven and days and feels manic. Says he has more energy and focus. Does not present manic. He presents euthymic. 3.  On initial visit, psychoeducation provided regarding their symptoms and the various  things they can do to help manage them going forward including good  sleep hygiene and daily exercise. 4.  On initial visit, safety plan discussed at length including presenting to the nearest  emergency department or calling 911 if they develop thoughts of self-harm  or suicide. 5.  Follow up with me in 1 week, sooner if needed. Total time with patient was 50 minutes and more than 50 % of that time was spent counseling the patient on their symptoms, treatment, and expected goals.      Flores Tyson MD

## 2023-02-14 ENCOUNTER — HOSPITAL ENCOUNTER (OUTPATIENT)
Dept: PSYCHIATRY | Age: 33
Setting detail: THERAPIES SERIES
Discharge: HOME | End: 2023-02-14
Payer: MEDICARE

## 2023-02-14 VITALS
HEART RATE: 88 BPM | OXYGEN SATURATION: 95 % | RESPIRATION RATE: 17 BRPM | TEMPERATURE: 98.6 F | DIASTOLIC BLOOD PRESSURE: 85 MMHG | SYSTOLIC BLOOD PRESSURE: 135 MMHG

## 2023-02-14 DIAGNOSIS — F39 MOOD DISORDER (HCC): Primary | ICD-10-CM

## 2023-02-14 PROCEDURE — H2020 THER BEHAV SVC, PER DIEM: HCPCS

## 2023-02-14 PROCEDURE — 99215 OFFICE O/P EST HI 40 MIN: CPT | Performed by: PSYCHIATRY & NEUROLOGY

## 2023-02-14 RX ORDER — PANTOPRAZOLE SODIUM 40 MG/1
40 TABLET, DELAYED RELEASE ORAL DAILY
Qty: 30 TABLET | Refills: 0 | Status: SHIPPED | OUTPATIENT
Start: 2023-02-14

## 2023-02-14 NOTE — GROUP NOTE
Group Therapy Note    Date: 2/14/2023    Group Start Time: 10:00 AM  Group End Time: 11:00 AM  Group Topic: Psychoeducation    [facility]    CYNDI Ambrocio        Group Therapy Note  Clinician introduced the group members to reaction versus responses. Group members worked through the definition and meanings of each and discussed it as a group. Attendees: 5       Patient's Goal:      Notes:  Patient was cooperative and fully engaged in the group discussion and activity. Patient shared in the group and his anxiety level increased to the point that the clinician had him do 4 square breathing. Patient practiced saying no to other group members to help him feel more comfortable saying it out loud. Patient was able to return to baseline after the breathing. Patient escalated in his anxiety again after group and practiced his breathing returning to baseline once again. Status After Intervention:  Improved    Participation Level:  Active Listener and Interactive    Participation Quality: Appropriate      Speech:  pressured      Thought Process/Content: Linear      Affective Functioning: Exaggerated      Mood: elevated      Level of consciousness:  Preoccupied      Response to Learning: Capable of insight      Endings: None Reported    Modes of Intervention: Education, Support, and Reality-testing      Discipline Responsible: /Counselor      Signature:  CYNDI Ambrocio
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
## 2023-02-14 NOTE — GROUP NOTE
Group Therapy Note  Date: 2/14/2023     Group Start Time: 13:15 PM  Group End Time: 14:15 PM  Group Topic: Music Therapy     [facility]  Neil Carlx     Group Therapy Note     Topic: Guided Imagery & Music  Subject: Intentional Silence     Facilitator led guided imagery and music session focused on peace, distress tolerance with environmental stimulation. Live music stimuli was provided to support intervention. Attendees: 5     Notes:  Successfully completed guided imagery intervention. No needs verbalized at conclusion of session. Status After Intervention:  Improved     Participation Level:  Active Listener     Participation Quality: Appropriate and Attentive        Speech:  normal        Thought Process/Content: Logical     Affective Functioning: Congruent     Mood: euthymic     Level of consciousness:  Alert and Oriented x4     Response to Learning: Progressing to goal     Endings: None Reported     Modes of Intervention: Support, Exploration, Activity, and Media     Discipline Responsible: Psychoeducational Specialist     Signature:  Arielle Strange MM, MT-BC

## 2023-02-14 NOTE — GROUP NOTE
Group Therapy Note    Date: 2/14/2023    Group Start Time: 11:15 AM  Group End Time: 12:15 PM  Group Topic: Psychoeducation    [facility]    [address] [facility]        Group Therapy Note    Attendees: 5    Facilitator led a group discussion on the window of tolerance. Patients explored what the window of tolerance is and took a closer look at the symptoms of hyper and hypo arousal. Patients discussed ways to increase the optimal zone of the window of tolerance in order to reduce time spent in the hyper or hypo arousal zones. Patients also explored events or stimuli that trigger dysregulation and move them closer to hyper or hypo arousal. Facilitator then challenges patients to explore exercises or skills that can be utilized to bring patients back to baseline and stay within their optimal zone. Patient's Goal: Patient will understand the concept of the window of tolerance and be able to identify how they experience each zone. Patient will be able to explore ways to expand their optimal zone and reduce time spent within hyper or hypo arousal.     Notes:  Patient was able to complete window of tolerance handouts and verbally expressed understanding of the learning material. Patient first expressed that they only functioned within their hyper and hypo arousal zones, and that they didn't have an optimal zone. Facilitator asked patient to reflect on the symptoms of both zones and whether the patient was experiencing any currently. Patient then made the connection that they were currently in their optimal zone. Status After Intervention:  Improved    Participation Level:  Active Listener and Interactive    Participation Quality: Appropriate, Attentive, and Sharing      Speech:  normal      Thought Process/Content: Logical  Linear      Affective Functioning: Congruent      Mood: euthymic      Level of consciousness:  Alert, Oriented x4, and Attentive      Response to Learning: Able to verbalize current knowledge/experience, Able to verbalize/acknowledge new learning, Capable of insight, Able to change behavior, and Progressing to goal      Endings: None Reported    Modes of Intervention: Education and Exploration      Discipline Responsible: /Counselor      Signature:  SYED Agarwal

## 2023-02-21 ENCOUNTER — HOSPITAL ENCOUNTER (OUTPATIENT)
Dept: PSYCHIATRY | Age: 33
Setting detail: THERAPIES SERIES
Discharge: HOME | End: 2023-02-21
Payer: COMMERCIAL

## 2023-02-21 DIAGNOSIS — F39 MOOD DISORDER (HCC): Primary | ICD-10-CM

## 2023-02-21 PROCEDURE — H2020 THER BEHAV SVC, PER DIEM: HCPCS

## 2023-02-21 NOTE — GROUP NOTE
Group Therapy Note    Date: 2/21/2023    Group Start Time:  8:30 AM  Group End Time:  9:45 AM  Group Topic: Psychotherapy    NOMANZ REGINA Howard        Group Therapy Note    Group members will use structure of psychotherapy to explore thoughts/feelings/behaviors and their impact on self, relationships, and engagement with surroundings. Attendees: 5       Notes:  Poncho Never shared that he has had poor sleep over the last week leading to increased restlessness and hyperfocus on tasks he needs to complete in the home like cleaning and taking care of animals. He is perseverative what he perceives as continued symptoms of benzodiazapine withdrawals which he cites as giving him \"profound\" hallucinations, hearing voices and seeing people around him while he is in his home. He received positive support from peers who encouraged him to prioritize his sleep, in line with previous discussions on self-care being \"giving yourself what you need, not what you want. \" He was receptive to feedback but was unable to answer the question \"What would it take for you to give yourself what you need? \"      Status After Intervention:  Improved    Participation Level: Interactive    Participation Quality: Sharing and Supportive      Speech:  pressured      Thought Process/Content: Linear      Affective Functioning: Exaggerated      Mood: anxious and elevated      Level of consciousness:  Alert and Attentive      Response to Learning: Capable of insight and Progressing to goal      Endings: None Reported    Modes of Intervention: Support, Socialization, Exploration, Clarifying, and Problem-solving      Discipline Responsible: Psychoeducational Specialist      Signature:  Nikky Barrientos MM, MT-BC
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
## 2023-02-21 NOTE — GROUP NOTE
Group Therapy Note    Date: 2/21/2023    Group Start Time:  1:15 PM  Group End Time:  2:15 PM  Group Topic: Activity    [facility] [address]        Group Therapy Note    Attendees: 5    Facilitator led a group activity on grounding and being present in the hear and now. Patients and facilitator went on a short outside walk. Patients were instructed to find and take only one picture of a beautiful item they observe on the walk. Whilst on the hospital walking trail, patients took a few minutes to practice grounding techniques, like observing the five senses and deep breathing. After returning to the therapy room, patients shared their photos and discussed the process of finding a beautiful item. Patients collectively acknowledged that the walk was less that ideal due to the slight breeze, litter, and dormant trees but noted they were all still able to find and take a picture because they were focused on the objective. Patients explored how they can apply the same attitude to their own lives. Patients also discussed how grounding helped them stay focused in the moment and appreciate what they had in front of them. Notes:  Patient was engaged in the group activity and was actively involved in the discussion. Patient took a picture of a area the group members with all their backs turned. Patient expressed satisfaction in the image selected and reported that appreciation for the support of group members, and for the journey of individual personal growth they are experiencing together. Status After Intervention:  Improved    Participation Level:  Active Listener and Interactive    Participation Quality: Appropriate, Attentive, and Sharing      Speech:  normal      Thought Process/Content: Logical  Linear      Affective Functioning: Congruent      Mood: euthymic      Level of consciousness:  Alert, Oriented x4, and Attentive      Response to Learning: Capable of insight, Able to change behavior, and Progressing to goal      Endings: None Reported    Modes of Intervention: Socialization and Activity      Discipline Responsible: /Counselor      Signature:  SYED Conti

## 2023-02-21 NOTE — GROUP NOTE
Group Therapy Note    Date: 2/21/2023    Group Start Time: 11:15 AM  Group End Time: 12:15 PM  Group Topic: Psychoeducation    [facility]    CYNDI Roa        Group Therapy Note  Clinician introduce the group to cognitive dissonance and how the inconsistency of their thoughts create the discomfort they feel when the thoughts contradict each other. Group members likened it to the difference of knowing a truth and believing a truth. Attendees: 5       Patient's Goal:      Notes:  Patient was cooperative and fully engaged in the group discussion and activity. Patient verbalized his thoughts on the cognitive dissonance and the difference from knowing and believing. Patient was supportive to other members when they worked out a cognitive reframing. They asked clarifying questions and were able to accept feedback from others. Status After Intervention:  Improved    Participation Level:  Active Listener and Interactive    Participation Quality: Appropriate, Attentive, Sharing, and Supportive      Speech:  normal      Thought Process/Content: Logical      Affective Functioning: Congruent      Mood: anxious      Level of consciousness:  Attentive      Response to Learning: Able to verbalize current knowledge/experience      Endings: None Reported    Modes of Intervention: Education, Support, Exploration, and Activity      Discipline Responsible: /Counselor      Signature:  CYNDI Roa

## 2023-02-23 ENCOUNTER — FOLLOWUP TELEPHONE ENCOUNTER (OUTPATIENT)
Dept: PSYCHIATRY | Age: 33
End: 2023-02-23

## 2023-02-23 NOTE — TELEPHONE ENCOUNTER
Pt absent from IOP today. Writer was able to reach pt by phone. Pt states he has a stomach virus. This is pt's 2nd absence.

## 2023-02-24 NOTE — BH NOTE
Outpatient Treatment Team Progress Note  Date of Review: 2/21/23      Multidisciplinary Outpatient Treatment Team met to discuss and review patient's progress in group and individual therapy sessions. Presenting Problem: Mood disorder    Patient's Current Treatment Status: Patient enrolled in the program for mood stabilization. Patient is engaging in psychotherapy and psycho education for their symptoms and the various things they can do to help manage them going forward, including good  sleep hygiene and daily exercise.     Treatment Start Date: 2/2/23     Tentative Discharge Date: 3/14/23

## 2023-02-28 ENCOUNTER — HOSPITAL ENCOUNTER (OUTPATIENT)
Dept: PSYCHIATRY | Age: 33
Setting detail: THERAPIES SERIES
Discharge: HOME | End: 2023-02-28
Payer: COMMERCIAL

## 2023-02-28 DIAGNOSIS — F39 MOOD DISORDER (HCC): Primary | ICD-10-CM

## 2023-02-28 PROCEDURE — H2020 THER BEHAV SVC, PER DIEM: HCPCS

## 2023-02-28 NOTE — GROUP NOTE
Group Therapy Note    Date: 2/28/2023    Group Start Time: 11:15 AM  Group End Time: 12:12 PM  Group Topic: Psychotherapy    [facility]    CYNDI Espinoza        Group Therapy Note  Clinician introduced the members to the 5 Love languages. Attendees: 3       Patient's Goal:      Notes:  Patient was cooperative and fully engaged in the group lesson and activity. Patient shared with the group that they are in couple's counseling and currently learning the love languages with their partner. Patient was able to provide insight tot he group members about how they are learning to respect each other's different love language. Patient discussed how the strengths from the last group correspond with the love languages. Status After Intervention:  Improved    Participation Level:  Active Listener and Interactive    Participation Quality: Appropriate, Attentive, Sharing, and Supportive      Speech:  normal      Thought Process/Content: Logical      Affective Functioning: Congruent      Mood: anxious and fearful      Level of consciousness:  Attentive      Response to Learning: Able to verbalize current knowledge/experience      Endings: None Reported    Modes of Intervention: Education, Support, Exploration, and Activity      Discipline Responsible: /Counselor      Signature:  CYNDI Espinoza

## 2023-02-28 NOTE — GROUP NOTE
Group Therapy Note    Date: 2/28/2023    Group Start Time:  1:15 PM  Group End Time:  2:15 PM  Group Topic: Psychoeducation    SHIV Howard        Group Therapy Note    Topic: Challenging Negative Self-Talk    Attendees: 3       Notes: Active engagement in group discussions, reflecting on externalization practices, ways to challenge negative self-talk through thought-stopping, reality-orientation, and sources for methods of self-talk.    Status After Intervention:  Improved    Participation Level: Active Listener and Interactive    Participation Quality: Sharing and Supportive      Speech:  normal      Thought Process/Content: Logical      Affective Functioning: Congruent      Mood: euthymic      Level of consciousness:  Alert and Oriented x4      Response to Learning: Capable of insight and Progressing to goal      Endings: None Reported    Modes of Intervention: Education, Support, Exploration, Problem-solving, and Reality-testing      Discipline Responsible: Psychoeducational Specialist      Signature:  Neil Howard, PATRIZIA, MT-BC

## 2023-02-28 NOTE — GROUP NOTE
Group Therapy Note    Date: 2/28/2023    Group Start Time:  8:30 AM  Group End Time:  9:45 AM  Group Topic: Psychotherapy    [facility]        Group Therapy Note    Group members will use structure of psychotherapy to explore thoughts/feelings/behaviors and their impacts on self, relationships, and engagement with surroundings. Attendees: 3       Notes:  Josefa Quintero entered group drowsy and lethargic, reporting that he worked night shift last night and has not slept. With Yehuda's hx of poor sleep hygiene, he received encouragement from peers to prioritize resting after leaving group therapy for the day. He was receptive to this encouragement but responded stating \"I'm not good at it. I never will be good at it. \" He reports that his thoughts feel \"punchdrunk\" and his body feels \"heavy\". His focus throughout group programming has been managing symptoms of PTSD, managing anxiety, and managing his fear of responses to things he cannot control. He shared with peers that when he has been feeling more numb recently, reporting that with his borderline personality disorder, this feeling leaves him \"empty\" and dwelling on everything negative in his life. Josefa Quintero also assisted peers in group with processing their medications side-effects, in particular responses to [facility] leaving them feeling numb. Status After Intervention:  Improved    Participation Level:  Active Listener and Interactive    Participation Quality: Appropriate, Sharing, and Supportive      Speech:  normal      Thought Process/Content: Logical      Affective Functioning: Congruent      Mood: euthymic      Level of consciousness:  Alert and Oriented x4      Response to Learning: Progressing to goal      Endings: None Reported    Modes of Intervention: Support, Socialization, Exploration, Clarifying, and Problem-solving      Discipline Responsible: Psychoeducational Specialist      Signature:  Edenilson Bae, MM, MT-BC

## 2023-02-28 NOTE — GROUP NOTE
Group Therapy Note    Date: 2/28/2023    Group Start Time: 10:00 AM  Group End Time: 11:00 AM  Group Topic: Psychoeducation    [facility]    CYNDI Dalton        Group Therapy Note  Clinician introduced the group members to the VIA 24 Character strengths self assessment. Patient took the 80 questionnaire where it ranks the strengths in the order one is using them. Patient reviewed their results as a group and discussed how they felt they were ranked appropriately. Patients then went over the things that they value and how they were congruent with their strengths. Attendees: 3       Patient's Goal:      Notes:  Patient was cooperative and completed the self assessment. Patient discussed their values and how knowing they have the strengths and values helps to offset just the intrusive negative thoughts. Status After Intervention:  Improved    Participation Level:  Active Listener and Interactive    Participation Quality: Appropriate, Attentive, and Sharing      Speech:  normal      Thought Process/Content: Logical      Affective Functioning: Congruent      Mood: anxious      Level of consciousness:  Oriented x4      Response to Learning: Able to verbalize/acknowledge new learning      Endings: None Reported    Modes of Intervention: Education, Support, Exploration, and Activity      Discipline Responsible: /Counselor      Signature:  CYNDI Dalton

## 2023-03-02 ENCOUNTER — HOSPITAL ENCOUNTER (OUTPATIENT)
Dept: PSYCHIATRY | Age: 33
Setting detail: THERAPIES SERIES
Discharge: HOME | End: 2023-03-02
Payer: COMMERCIAL

## 2023-03-02 PROCEDURE — H2020 THER BEHAV SVC, PER DIEM: HCPCS

## 2023-03-02 NOTE — GROUP NOTE
Group Therapy Note    Date: 3/2/2023    Group Start Time: 11:15 AM  Group End Time: 12:15 PM  Group Topic: Psychoeducation    SHIV TAPIA I    [address] [facility]        Group Therapy Note    Attendees: 4    Facilitator led group discussion on SMART goals. Patients explored what a SMART goal was and the purpose of using a tool when changing behaviors. Patients were provided SMART goal worksheets and broke down their own goals to fit the criteria of a SMART goal. Patients then developed sub-goals and discussed how small action steps can support SMART goal success. Notes:  Patient was experiencing extreme fatigue in group and reported that they have not been able to sleep well recently. Patient explored getting more sleep as a goal and was able to break that down into a SMART goal. Patients final draft of their goal was \"Speak to the doctor today and schedule an appointment for within the next two weeks to address lack of sleep\". Status After Intervention:  Improved    Participation Level:  Active Listener and Interactive    Participation Quality: Appropriate and Sharing      Speech:  normal      Thought Process/Content: Logical  Linear      Affective Functioning: Flat      Mood: euthymic      Level of consciousness:  Oriented x4 and Drowsy      Response to Learning: Able to verbalize current knowledge/experience, Capable of insight, Able to change behavior, and Progressing to goal      Endings: None Reported    Modes of Intervention: Education and Exploration      Discipline Responsible: /Counselor      Signature:  [facility] [facility]

## 2023-03-02 NOTE — GROUP NOTE
Group Therapy Note    Date: 3/2/2023    Group Start Time:  1:15 PM  Group End Time:  2:15 PM  Group Topic: Music Therapy    [facility]        Group Therapy Note    Group members created personal album covers representing aspects of the self. Attendees: 4       Notes:  Lex Lyons was minimally engaged and passively participated in group activity. He stated he was too tired to focus.     Status After Intervention:  Unchanged    Participation Level: Minimal    Participation Quality: Resistant and Lethargic      Speech:  slurred      Thought Process/Content: Linear      Affective Functioning: Constricted/Restricted      Mood: depressed and dysphoric      Level of consciousness:  Preoccupied      Response to Learning: Progressing to goal      Endings: None Reported    Modes of Intervention: Support, Socialization, Exploration, Activity, and Media      Discipline Responsible: Psychoeducational Specialist      Signature:  Denise Prado MM, MT-BC

## 2023-03-02 NOTE — GROUP NOTE
Group Therapy Note    Date: 3/2/2023    Group Start Time: 10:00 AM  Group End Time: 11:00 AM  Group Topic: Psychoeducation    [facility]    CYNDI Willis        Group Therapy Note  Clinician introduced the group members to wave surfing mindfulness activity to help them overcome their urges with eating things that are not good for them or addiction urges. Clinician introduced the members to identifying replacement behaviors to distract them from the urges or unwanted behaviors. Attendees: 4       Patient's Goal:      Notes:  Patient struggled staying awake during the group, reporting that he did not sleep last night. Clinician had the patient engage in the group to testify how urge surfing and 4 square breathing have helped him as he has been using the techniques since he started the program. Once the patient began sharing, he stayed engaged in the group for the duration. Status After Intervention:  Improved    Participation Level:  Active Listener and Interactive    Participation Quality: Appropriate, Attentive, Sharing, and Supportive      Speech:  normal      Thought Process/Content: Linear      Affective Functioning: Flat      Mood: anxious      Level of consciousness:  Drowsy      Response to Learning: Able to verbalize current knowledge/experience      Endings: None Reported    Modes of Intervention: Education, Support, Exploration, and Activity      Discipline Responsible: /Counselor      Signature:  CYNDI Willis
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
## 2023-03-07 ENCOUNTER — HOSPITAL ENCOUNTER (OUTPATIENT)
Dept: PSYCHIATRY | Age: 33
Setting detail: THERAPIES SERIES
Discharge: HOME | End: 2023-03-07
Payer: COMMERCIAL

## 2023-03-07 DIAGNOSIS — F39 MOOD DISORDER (HCC): Primary | ICD-10-CM

## 2023-03-07 PROCEDURE — H2020 THER BEHAV SVC, PER DIEM: HCPCS

## 2023-03-07 NOTE — GROUP NOTE
Group Therapy Note    Date: 3/7/2023    Group Start Time: 10:00 AM  Group End Time: 11:00 AM  Group Topic: Psychoeducation    NOMANZ REGINA Singer        Group Therapy Note    Attendees: 3    Facilitator led a group discussion on attachment theory and how it applies to relationships. Patients explored the four different attachment styles (secure, anxious, avoidant, and disorganized), and the different behaviors that are exhibited in each. After developing an understanding of attachment styles, patients reflected on their current style and how to move towards secure attachments. Notes:  Patient struggled to stay awake during group. Patient expressed being tired and not sleeping well. Facilitator challenged patient on their ability to continue group due to extreme fatigue. Patient wanted to stay and did contribute to group discussion from that point forward. Status After Intervention:  Improved    Participation Level:  Active Listener and Minimal    Participation Quality: Sharing and Lethargic      Speech:  normal      Thought Process/Content: Logical  Linear      Affective Functioning: Flat      Mood: euthymic      Level of consciousness:  Drowsy and Oriented X4       Response to Learning: Able to verbalize current knowledge/experience, Capable of insight, Able to change behavior, and Progressing to goal      Endings: None Reported    Modes of Intervention: Education and Exploration      Discipline Responsible: /Counselor      Signature:  SYED Lewis

## 2023-03-07 NOTE — GROUP NOTE
Group Therapy Note    Date: 3/7/2023    Group Start Time: 11:15 AM  Group End Time: 12:15 PM  Group Topic: Psychoeducation    [facility]    SHIRLEY Castro        Group Therapy Note    Attendees: 3       Patient's Goal:  read a story  together on The roberto present \"living life fully. \" Discussed the meaning of living in the present moment. Pt's asked to apply to their lives. Notes:  pt reported that he could relate to the story a lot with his personal life but did not share details with the group. Pt had difficultly staying awake and had his head laying on the table most of group. Pt was tearful at times when reading the story.      Status After Intervention:  Unchanged    Participation Level: Minimal    Participation Quality: Lethargic      Speech:  slurred      Thought Process/Content: Perseverating      Affective Functioning: Flat      Mood: dysphoric      Level of consciousness:  Drowsy      Response to Learning: Able to verbalize current knowledge/experience      Endings: None Reported    Modes of Intervention: Education, Support, Socialization, Exploration, and Clarifying      Discipline Responsible: /Counselor      Signature:  Lunda Homans, LISW

## 2023-03-07 NOTE — BH NOTE
Outpatient Treatment Team Progress Note  Date of Review: 3/7/23        Multidisciplinary Outpatient Treatment Team met to discuss and review patient's progress in group and individual therapy sessions. Presenting Problem: Mood disorder     Patient's Current Treatment Status: Patient enrolled in the program for mood stabilization. Patient is engaging in psychotherapy and psycho education for their symptoms and the various things they can do to help manage them going forward, including good  sleep hygiene and daily exercise.      Treatment Start Date: 2/2/23      Tentative Discharge Date: 3/14/23

## 2023-03-07 NOTE — GROUP NOTE
Group Therapy Note    Date: 3/7/2023    Group Start Time:  8:30 AM  Group End Time:  9:45 AM  Group Topic: Psychotherapy    [facility]    Neil Howard        Group Therapy Note    Group members will utilize structure of psychotherapy to explore thoughts/feelings/behaviors and their impacts on self, relationships, and engagement with environment. Attendees: 3       Notes:  Nathaly Allen entered group with drowsy/lethargic presentation with flat affect, \"swimming\" eyes, and loose/uncontrolled movements of head and neck. Nathaly Allen reports that he will have therapy every day this week, attending couples counseling and family therapy with his mother. He refused to disclose further information about these upcoming session, stating \"I wanna stay ambiguous because it's so taxing. \"     While elaborating on his lack of sleep, he reflected on the impact of having limited medications after getting sober from benzodiazepines. He stated that he is not open to receiving new medications due to hx of substance use. He was unable to identify alternative methods that will contribute to healthy sleep hygiene.     Status After Intervention:  Unchanged    Participation Level: Minimal    Participation Quality: Lethargic      Speech:  slurred      Thought Process/Content: Perseverating      Affective Functioning: Flat      Mood: dysphoric      Level of consciousness:  Drowsy      Response to Learning: Capable of insight      Endings: None Reported    Modes of Intervention: Support, Exploration, Clarifying, and Problem-solving      Discipline Responsible: Psychoeducational Specialist      Signature:  Quyhn Elam MM, MT-BC

## 2023-03-09 ENCOUNTER — HOSPITAL ENCOUNTER (OUTPATIENT)
Dept: PSYCHIATRY | Age: 33
Setting detail: THERAPIES SERIES
Discharge: HOME | End: 2023-03-09
Payer: COMMERCIAL

## 2023-03-09 DIAGNOSIS — F43.10 PTSD (POST-TRAUMATIC STRESS DISORDER): Primary | ICD-10-CM

## 2023-03-09 DIAGNOSIS — F41.1 GAD (GENERALIZED ANXIETY DISORDER): ICD-10-CM

## 2023-03-09 PROCEDURE — H2020 THER BEHAV SVC, PER DIEM: HCPCS

## 2023-03-09 NOTE — GROUP NOTE
Group Therapy Note    Date: 3/9/2023    Group Start Time:  8:30 AM  Group End Time:  9:45 AM  Group Topic: Psychotherapy    SHIV Wilson Ace, ATR-BC        Group Therapy Note    Attendees: 2    Group members engaged in a psychotherapy agenda group; identifying personal goals for group and utilizing group time to offer feedback and accept feedback from peers. Patient's Goal:  To stay awake    Notes:  Aileen Ramirez displayed positive ability to engage in verbal interaction with group peer. Group discussions focused on changing perspectives, the energy required to change perspectives, and the impact that changing perspectives have on group members personal lives. Aileen Ramirez remained active in discussion and his tiredness appeared to be minimal while engaging in thought provoking dialogue. Status After Intervention:  Improved    Participation Level:  Active Listener and Interactive    Participation Quality: Appropriate, Attentive, Sharing, and Supportive      Speech:  normal      Thought Process/Content: Logical  Linear      Affective Functioning: Constricted/Restricted      Mood: anxious, initially tired      Level of consciousness:  Alert, Oriented x4, and Attentive      Response to Learning: Able to verbalize current knowledge/experience, Able to verbalize/acknowledge new learning, Able to retain information, and Progressing to goal      Endings: None Reported    Modes of Intervention: Exploration, Clarifying, and Problem-solving      Discipline Responsible: Psychoeducational Specialist      Signature:  Kael Alonso MS, ATR-BC

## 2023-03-09 NOTE — GROUP NOTE
Group Therapy Note    Date: 3/9/2023    Group Start Time: 10:00 AM  Group End Time: 11:00 AM  Group Topic: Psychoeducation    [facility]    CYNDI Mckeon        Group Therapy Note  Clinician introduced the group to the relationship between their thoughts, feelings and reactions. Clinician also gave them a handout of 6 steps to help with emotional regulation. Attendees: 2       Patient's Goal:      Notes:  Patient put his head down on the table for most of the group complaining that he did not feel well and was throwing up yesterday. Patient did respond to the clinician at the end of the group and shared how the CBT triangle and 4 square breathing helps him maintain on his baseline of functioning.      Status After Intervention:  Unchanged    Participation Level: Minimal    Participation Quality: Lethargic      Speech:  normal      Thought Process/Content: Linear      Affective Functioning: Blunted and Flat      Mood: depressed      Level of consciousness:  Drowsy      Response to Learning: Progressing to goal      Endings: None Reported    Modes of Intervention: Education and Activity      Discipline Responsible: /Counselor      Signature:  CYNDI Mckeon

## 2023-03-09 NOTE — GROUP NOTE
Group Therapy Note    Date: 3/9/2023    Group Start Time: 11:15 AM  Group End Time: 12:15 PM  Group Topic: Psychoeducation    [facility]    [address] [facility]        Group Therapy Note    Attendees: 2    Facilitator led a group discussion on Cognitive Behavioral Therapy (CBT). Patients explored how CBT theory can help navigate behavioral changes. Patients discussed how thoughts and feelings impact behavior, then explored different types of cognitive distortions and how they play a part in the CBT theory. Notes:  Patient expressed understanding of the CBT model and could identify ways that thoughts directly impacted feelings and behaviors. Patient accepted informative handout on CBT cognitive distortions and was able to identify times where  unhelpful thoughts have been present. Patient discussed ways to challenge unhelpful, negative thoughts. Status After Intervention:  Improved    Participation Level:  Active Listener and Interactive    Participation Quality: Appropriate and Attentive      Speech:  normal      Thought Process/Content: Logical  Linear      Affective Functioning: Congruent      Mood: euthymic      Level of consciousness:  Alert, Oriented x4, and Attentive      Response to Learning: Able to verbalize current knowledge/experience, Able to verbalize/acknowledge new learning, Capable of insight, Able to change behavior, and Progressing to goal      Endings: None Reported    Modes of Intervention: Education      Discipline Responsible: /Counselor      [facility]  [facility]

## 2023-03-14 ENCOUNTER — HOSPITAL ENCOUNTER (OUTPATIENT)
Dept: PSYCHIATRY | Age: 33
Setting detail: THERAPIES SERIES
Discharge: HOME | End: 2023-03-14
Payer: MEDICARE

## 2023-03-14 ENCOUNTER — APPOINTMENT (OUTPATIENT)
Dept: PSYCHIATRY | Age: 33
End: 2023-03-14
Payer: MEDICARE

## 2023-03-14 NOTE — BH NOTE
Writer met with Hamlet Menjivar 1:1 in lieu of psychotherapy - discussed specific goals for extension of IOP services. Hamlet Menjivar identified his goals of treatment thus far, citing mindfulness skills, grounding techniques, and healthy communication strategies with partner. Hamlet Menjivar endorses that he has continued engagement in couples and family therapy during time present in IOP, has extinguished individual therapy services in line with  policies. He cites need for continued tx in IOP to continue developing DBT skills to better manage symptoms of borderline personality disorder, \"lower the highs and raise the lows. \"

## 2023-03-14 NOTE — BH NOTE
Pt notified of Tuesday/Thursday IOP closing as of this week. Pt given option to move to Monday, Wednesday, Friday groups. Pt will speak with partner and call back.

## 2023-03-16 ENCOUNTER — APPOINTMENT (OUTPATIENT)
Dept: PSYCHIATRY | Age: 33
End: 2023-03-16
Payer: MEDICARE

## 2023-03-20 ENCOUNTER — HOSPITAL ENCOUNTER (OUTPATIENT)
Dept: PSYCHIATRY | Age: 33
Setting detail: THERAPIES SERIES
Discharge: HOME | End: 2023-03-20
Payer: MEDICARE

## 2023-03-20 NOTE — GROUP NOTE
Group Therapy Note    Date: 3/20/2023    Group Start Time: 11:00 AM  Group End Time: 11:45 AM  Group Topic: Psychoeducation    [facility]    CYNDI G[facility]        Group Therapy Note  Clinician introduced the fight flight and Fernanda response to trauma. Patients discussed their triggers, symptoms and fernanda responses. Attendees: 7       Patient's Goal:      Notes:  Patient was cooperative and fully engaged in the group. Patient participated in naming triggers and symptoms of anxiety from the fight/flight response. Patient learned about the behaviors in fawning and shared their experience with the group members. Status After Intervention:  Improved    Participation Level:  Active Listener and Interactive    Participation Quality: Appropriate, Attentive, Sharing, and Supportive      Speech:  normal      Thought Process/Content: Logical      Affective Functioning: Congruent      Mood: euthymic      Level of consciousness:  Attentive      Response to Learning: Able to verbalize/acknowledge new learning      Endings: None Reported    Modes of Intervention: Education, Support, Exploration, and Activity      Discipline Responsible: /Counselor      Signature:  CYNDI Goss

## 2023-03-20 NOTE — GROUP NOTE
Group Therapy Note    Date: 3/20/2023    Group Start Time: 11:15 AM  Group End Time: 12:15 PM  Group Topic: Psychoeducation    [facility]    [address] [facility]        Group Therapy Note    Attendees: 7    Facilitator led group discussion on self-care; what it is, how it is executed, and why it is important. Patients brainstormed what self-care means to them and any barriers (perceived or actual) that prevents patients from adopting regular healthy self-care practices. Facilitator then presented a 12-minutes Norm Talk by Froilan Meza named \"DEAN self-care isn't selfish\". Patients reflected on the video as a group and explored the question of Kusum Wright is worthy of self-care? \". Patients used the brainstorming and video to explore their own worth when it comes to giving themselves affirmation, nurturing, and boundaries. Notes:  Patient struggled to stay awake and periodically fell asleep. Patient did contribute to the discussion at times whilst laying his head on the table. Status After Intervention:  Unchanged    Participation Level:  Active Listener and Minimal    Participation Quality: Lethargic      Speech:  Blunt      Thought Process/Content: Logical  Linear      Affective Functioning: Flat      Mood: irritable      Level of consciousness:  Drowsy      Response to Learning: Capable of insight and Able to change behavior      Endings: None Reported    Modes of Intervention: Education and Exploration      Discipline Responsible: /Counselor      Signature:  [address] [facility]

## 2023-03-20 NOTE — GROUP NOTE
Group Therapy Note    Date: 3/20/2023    Group Start Time:  8:30 AM  Group End Time:  9:45 AM  Group Topic: Psychotherapy    [facility] REGINA Howard        Group Therapy Note    Members will use structure of psychotherapy to explore thoughts/feelings/behaviors and their impacts on the self, relationships, and engagement with environment. Attendees: 7       Notes:  Angel Cochran shared that he is tired and sore with slow/foggy thoughts after poor sleep over recent days. This is a consistent theme across duration of treatment in [facility]. Topics that arose during psychotherapy group were pertinent to Yehuda's personally-identified goals for extending treatment in [facility], however he was resistant to engage repeatedly citing feeling too tired. Peer sitting beside Ortiz Side repeatedly \"nudged\" and elbowed Ortiz Side to try to keep him awake, Yehuda positively receptive to this prompting but still struggled to maintain presence and engagement during group process.      Status After Intervention:  Unchanged    Participation Level: Minimal    Participation Quality: Lethargic      Speech:  slurred and avoidant      Thought Process/Content: Perseverating      Affective Functioning: Flat      Mood: dysphoric      Level of consciousness:  Drowsy      Response to Learning: Resistant      Endings: None Reported    Modes of Intervention: Support, Socialization, Exploration, and Problem-solving      Discipline Responsible: Psychoeducational Specialist      Signature:  Marc Brito, MM, MT-BC

## 2023-03-21 ENCOUNTER — APPOINTMENT (OUTPATIENT)
Dept: PSYCHIATRY | Age: 33
End: 2023-03-21
Payer: MEDICARE

## 2023-03-23 ENCOUNTER — APPOINTMENT (OUTPATIENT)
Dept: PSYCHIATRY | Age: 33
End: 2023-03-23
Payer: MEDICARE

## 2023-03-24 ENCOUNTER — HOSPITAL ENCOUNTER (OUTPATIENT)
Dept: PSYCHIATRY | Age: 33
Setting detail: THERAPIES SERIES
Discharge: HOME | End: 2023-03-24
Payer: MEDICARE

## 2023-03-24 DIAGNOSIS — F41.1 GAD (GENERALIZED ANXIETY DISORDER): Primary | ICD-10-CM

## 2023-03-24 PROCEDURE — H2020 THER BEHAV SVC, PER DIEM: HCPCS

## 2023-03-24 NOTE — GROUP NOTE
Group Therapy Note    Date: 3/24/2023    Group Start Time:  8:30 AM  Group End Time:  9:45 AM  Group Topic: Psychotherapy    [facility], ATR        Group Therapy Note    Attendees: Group members engaged in psychotherapy agenda setting group. Members identified personal goals/agendas of focus for the group, in an effort to practice effective communication of thoughts and emotions. Group members were encouraged to use the group space and time to effectively practice skills and develop strategies to practice skills outside of group. Notes:  Angel Cochran did not identify an individual goal for agenda psychotherapy group. He was able to actively listen to fellow group members and offer insightful and somewhat challenging feedback. He was able to encourage group members to attempt to see things from alternative perspectives in an effort to increase their quality of life and ability to have healthy interactions with others in their lives. Angel Cochran seemed grateful for the focus to be on the agenda of others today but did seem motivated to engage in group process. Status After Intervention:  Improved    Participation Level:  Active Listener and Interactive    Participation Quality: Appropriate, Attentive, and Supportive      Speech:  normal and hesitant      Thought Process/Content: Logical  Linear      Affective Functioning: Constricted/Restricted      Mood: depressed (decreased)      Level of consciousness:  Alert, Oriented x4, and Attentive      Response to Learning: Able to verbalize current knowledge/experience, Able to verbalize/acknowledge new learning, Able to retain information, and Progressing to goal      Endings: None Reported    Modes of Intervention: Support, Exploration, Clarifying, and Problem-solving      Discipline Responsible: Psychoeducational Specialist      Signature:  Thais Carver, MS, ATR-BC

## 2023-03-24 NOTE — GROUP NOTE
Group Therapy Note    Date: 3/24/2023    Group Start Time: 10:00 AM  Group End Time: 11:00 AM  Group Topic: Psychoeducation    [facility]    Zayda Sadlerl        Group Therapy Note    Attendees: 8    Facilitator led a Psychoeducational group that explored cognitive distortions. Patients were provided informational handouts on \"stinking thinking\" and reviewed different types of cognitive distortions. For each distortion, patients reflected on their own thinking and behaviors, and how the cognitive distortion applies. Patients were given homework to read over the informational handouts and come to the next session with facilitator (next Monday) with one or two cognitive distortions they wish to challenge/change. Patients will explore how to challenge cognitive distortions next week. Notes:  Patient was engaged in group discussion and volunteered to share experiences with the group that related to the content being discussed. Patient felt that many of the cognitive distortions were learned behaviors but expressed wanting to change them. Status After Intervention:  Improved    Participation Level:  Active Listener and Interactive    Participation Quality: Appropriate and Attentive      Speech:  normal      Thought Process/Content: Logical  Linear      Affective Functioning: Congruent      Mood: euthymic      Level of consciousness:  Alert, Oriented x4, and Attentive      Response to Learning: Able to verbalize current knowledge/experience, Able to verbalize/acknowledge new learning, Able to retain information, Capable of insight, Able to change behavior, and Progressing to goal      Endings: None Reported    Modes of Intervention: Education, Support, and Exploration      Discipline Responsible: /Counselor      Signature:  SYED Harvey

## 2023-03-24 NOTE — GROUP NOTE
Group Therapy Note    Date: 3/24/2023    Group Start Time: 11:15 AM  Group End Time: 12:15 PM  Group Topic: Music Therapy    [facility]    Neil Howard        Group Therapy Note    Group members engaged in social group exploring music as a tool for healthy expression, catharsis, and coping. Group members were encouraged to identify 3 songs correlating with 1 of each feeling: relaxed, understood, strong. Group members collaborated to explore which member each song belongs to, explore emotional responses therein. Attendees: 8       Notes:  Pt present and actively engaged across group process. No needs verbalized. Status After Intervention:  Improved    Participation Level:  Active Listener and Interactive    Participation Quality: Sharing and Supportive      Speech:  normal      Thought Process/Content: Logical      Affective Functioning: Congruent      Mood: euthymic      Level of consciousness:  Alert and Attentive      Response to Learning: Capable of insight and Progressing to goal      Endings: None Reported    Modes of Intervention: Support, Socialization, Exploration, Clarifying, Activity, and Media      Discipline Responsible: Psychoeducational Specialist      Signature:  Aurelio Salazar, MM, MT-BC

## 2023-03-27 ENCOUNTER — HOSPITAL ENCOUNTER (OUTPATIENT)
Dept: PSYCHIATRY | Age: 33
Setting detail: THERAPIES SERIES
Discharge: HOME | End: 2023-03-27
Payer: MEDICAID

## 2023-03-27 PROCEDURE — H2020 THER BEHAV SVC, PER DIEM: HCPCS

## 2023-03-27 RX ORDER — PANTOPRAZOLE SODIUM 40 MG/1
40 TABLET, DELAYED RELEASE ORAL DAILY
Qty: 30 TABLET | Refills: 0 | Status: SHIPPED | OUTPATIENT
Start: 2023-03-27

## 2023-03-27 NOTE — GROUP NOTE
Group Therapy Note    Date: 3/27/2023    Group Start Time:  8:30 AM  Group End Time:  9:45 AM  Group Topic: Psychotherapy    [facility]    CYNDI Wong        Group Therapy Note  Group members engaged in psychotherapy agenda group. Group members identified their individual agendas/goals and utilized the duration of group time to process, give, and receive feedback related to progress of goals. Focus was placed on remaining in the here and now in order to apply feedback learned in group to actual life circumstances and situations. Attendees: 7       Patient's Goal:  Patient stated \"he can relate to dealing with frustrations this morning. \"    Notes:  Patient shared with the group that his medical transport person did not come this morning and when he called, they told him he was put down for a no show, no call. Patient was able to process the feelings that he had with the group. He stated that today he just needed to vent and talk it out. Patient was responsive to other group members today. He shared supportive suggestions to other members and he was able to accept feedback from the group members and the clinician. Status After Intervention:  Improved    Participation Level:  Active Listener    Participation Quality: Appropriate and Attentive      Speech:  normal      Thought Process/Content: Logical      Affective Functioning: Congruent      Mood: elevated      Level of consciousness:  Attentive      Response to Learning: Able to verbalize/acknowledge new learning      Endings: None Reported    Modes of Intervention: Support and Exploration      Discipline Responsible: /Counselor      Signature:  CYNDI Wong

## 2023-03-27 NOTE — GROUP NOTE
Group Therapy Note    Date: 3/27/2023    Group Start Time: 11:15 AM  Group End Time: 12:15 PM  Group Topic: Psychoeducation    SHIV Howard        Group Therapy Note    Topic: Anxiety + Catastrophic Thinking    Group members explored catastrophism in their, their individual behaviors of catastrophism, and explored methods of questioning to challenge catastrophic thinking. Attendees: 8       Notes: This pt was present and actively engaged across group discussions of catastrophism. Collaborative with peers in process, verbalized understanding of information provided and resources offered. No needs verbalized at conclusion of session. Status After Intervention:  Improved    Participation Level:  Active Listener and Interactive    Participation Quality: Sharing and Supportive      Speech:  normal      Thought Process/Content: Logical      Affective Functioning: Congruent      Mood: euthymic      Level of consciousness:  Alert and Attentive      Response to Learning: Capable of insight and Progressing to goal      Endings: None Reported    Modes of Intervention: Education, Support, Socialization, Exploration, and Problem-solving      Discipline Responsible: Psychoeducational Specialist      Signature:  Maegan Sams MM, MT-BC

## 2023-03-27 NOTE — GROUP NOTE
Group Therapy Note    Date: 3/27/2023    Group Start Time: 10:00 AM  Group End Time: 11:00 AM  Group Topic: Psychoeducation    [facility]    Zayda Singer        Group Therapy Note    Attendees: 8    Facilitator led a Psychoeducational group on healthy routines and habits. Patients discussed the steps for developing a healthy routine and establishing positive habits. Patients were provided informational handouts on creating a personalize routine and then brainstormed tasks that would be useful to consider when building a schedule. Notes:  Patient was fully engaged in group discussion and contributed ideas during brainstorming. Patient expressed ease in building a routine but a struggle to stick with it. Patients explored obstacles that can interfere with consistency in routines and SMART goals to help keep patients on track. Status After Intervention:  Improved    Participation Level:  Active Listener and Interactive    Participation Quality: Appropriate, Attentive, and Sharing      Speech:  normal      Thought Process/Content: Logical  Linear      Affective Functioning: Congruent      Mood: euthymic      Level of consciousness:  Alert, Oriented x4, and Attentive      Response to Learning: Able to verbalize current knowledge/experience, Able to verbalize/acknowledge new learning, Able to change behavior, and Progressing to goal      Endings: None Reported    Modes of Intervention: Education and Support      Discipline Responsible: /Counselor      Signature:  SYED Thomas

## 2023-03-28 ENCOUNTER — APPOINTMENT (OUTPATIENT)
Dept: PSYCHIATRY | Age: 33
End: 2023-03-28
Payer: MEDICARE

## 2023-03-28 NOTE — CARE COORDINATION
Outpatient Treatment Team Progress Note  Date of Review: 3/2823        Multidisciplinary Outpatient Treatment Team met to discuss and review patient's progress in group and individual therapy sessions. Presenting Problem: Mood disorder     Patient's Current Treatment Status: Patient enrolled in the program for mood stabilization. Patient is engaging in psychotherapy and psycho education for symptom management and healthy coping skills including improved sleep hygiene and daily exercise. Patient will benefit from continued exploration of healthy support structures and functional independence.      Treatment Start Date: 2/2/23      Tentative Discharge Date: 3/14/23

## 2023-03-30 ENCOUNTER — APPOINTMENT (OUTPATIENT)
Dept: PSYCHIATRY | Age: 33
End: 2023-03-30
Payer: MEDICARE

## 2023-03-31 ENCOUNTER — HOSPITAL ENCOUNTER (OUTPATIENT)
Dept: PSYCHIATRY | Age: 33
Setting detail: THERAPIES SERIES
Discharge: HOME | End: 2023-03-31
Payer: MEDICAID

## 2023-03-31 PROCEDURE — H2020 THER BEHAV SVC, PER DIEM: HCPCS

## 2023-03-31 NOTE — GROUP NOTE
Group Therapy Note    Date: 3/31/2023    Group Start Time: 10:00 AM  Group End Time: 12:00 PM  Group Topic: Process Group - Outpatient    [facility]        Group Therapy Note    Attendees: 7      Facilitator led a process group using lyndon as an expressive medium. Patients were directed to use the lyndon to make a totem pole representing their past, present, and future. Patients were asked to consider symbols or images that reflect their thoughts and feelings for each section. After creating the totem poles, patients were given an opportunity to share their work and discuss the experience of creating the pieces. As a group, patients explored common themes and how, if at all, different periods of life impact progression towards future goals. Notes:  Patient expressed frustration and reluctance to engage in lyndon directive. Patient reported that criticism from an  makes him feel frustrated about making art. Patient appeared to get visibly irritated throughout the directed and was observed destroying work repeatedly, sighing and grunting, and throwing the lyndon down on the table. Patient produced a smooth ball, and expressed that it represented refinement and smoothing out the cracks. Status After Intervention:  Unchanged    Participation Level:  Active Listener    Participation Quality: Attentive and Resistant      Speech:  normal      Thought Process/Content: Logical  Linear      Affective Functioning: Blunt      Mood: irritable      Level of consciousness:  Alert, Oriented x4, and Attentive      Response to Learning: Capable of insight and Able to change behavior      Endings: None Reported    Modes of Intervention: Socialization, Exploration, and Activity      Discipline Responsible: /Counselor      Signature:  SYED Card
Linear      Affective Functioning: Blunted and Flat      Mood: irritable      Level of consciousness:  Drowsy      Response to Learning: Able to change behavior      Endings: None Reported    Modes of Intervention: Support, Confrontation, and Limit-setting      Discipline Responsible: /Counselor      Signature:  CYNDI Lara

## 2023-04-03 ENCOUNTER — FOLLOWUP TELEPHONE ENCOUNTER (OUTPATIENT)
Dept: PSYCHIATRY | Age: 33
End: 2023-04-03

## 2023-04-03 NOTE — TELEPHONE ENCOUNTER
Pt absent today d/t no transportation. Today was pt's discharge day. Writer scheduled a bridge appt with Dr. Rubio Cunningham on 4/5/23.

## 2023-04-05 ENCOUNTER — HOSPITAL ENCOUNTER (OUTPATIENT)
Dept: PSYCHIATRY | Age: 33
Setting detail: THERAPIES SERIES
Discharge: HOME | End: 2023-04-05

## 2023-09-22 NOTE — GROUP NOTE
Group Therapy Note    Date: 2/9/2023    Group Start Time:  8:30 AM  Group End Time:  9:45 AM  Group Topic: Psychotherapy    [facility]    Neil Howard        Group Therapy Note    Group members will use agenda structure to explore thoughts/feelings/behaviors and their impacts on self, relationships, and engagement with surroundings. Attendees: 7       Notes:  Sumeet Selby reports feeling exhausted after a poor nights' sleep. He set agenda to may attention during session. Sumeet Selby was attentive to peers across process, reflective while discussing aspects of his inner child and desire for accountability from authority figures. He shared that he had a /[employer] instructor that he viewed as more authoritative and supportive than his own bio family. Facilitator assisted Sumeet Selby with identifying qualities and characteristics of this man, which of these he feels comfortable embodying and incorporating into self-talk and personal responsibility. Status After Intervention:  Improved    Participation Level:  Active Listener and Interactive    Participation Quality: Sharing and Supportive      Speech:  normal      Thought Process/Content: Logical      Affective Functioning: Congruent      Mood: euthymic      Level of consciousness:  Alert and Attentive      Response to Learning: Progressing to goal      Endings: None Reported    Modes of Intervention: Support, Socialization, Exploration, Clarifying, and Problem-solving      Discipline Responsible: Psychoeducational Specialist      Signature:  Wanda Pa MM, MT-BC High Dose Vitamin A Pregnancy And Lactation Text: High dose vitamin A therapy is contraindicated during pregnancy and breast feeding.

## 2024-02-28 ENCOUNTER — HOSPITAL ENCOUNTER (EMERGENCY)
Age: 34
Discharge: HOME | End: 2024-02-28
Attending: EMERGENCY MEDICINE
Payer: MEDICARE

## 2024-02-28 ENCOUNTER — APPOINTMENT (OUTPATIENT)
Dept: GENERAL RADIOLOGY | Age: 34
End: 2024-02-28
Payer: MEDICARE

## 2024-02-28 VITALS
HEIGHT: 67 IN | DIASTOLIC BLOOD PRESSURE: 63 MMHG | TEMPERATURE: 97.7 F | BODY MASS INDEX: 27.47 KG/M2 | OXYGEN SATURATION: 96 % | WEIGHT: 175 LBS | SYSTOLIC BLOOD PRESSURE: 107 MMHG | HEART RATE: 56 BPM | RESPIRATION RATE: 15 BRPM

## 2024-02-28 DIAGNOSIS — R07.9 CHEST PAIN, UNSPECIFIED TYPE: Primary | ICD-10-CM

## 2024-02-28 LAB
ANION GAP SERPL CALCULATED.3IONS-SCNC: 12 MMOL/L (ref 3–16)
BASOPHILS # BLD: 0.1 K/UL (ref 0–0.2)
BASOPHILS NFR BLD: 0.8 %
BUN SERPL-MCNC: 15 MG/DL (ref 7–20)
CALCIUM SERPL-MCNC: 9 MG/DL (ref 8.3–10.6)
CHLORIDE SERPL-SCNC: 102 MMOL/L (ref 99–110)
CK SERPL-CCNC: 81 U/L (ref 39–308)
CO2 SERPL-SCNC: 23 MMOL/L (ref 21–32)
CREAT SERPL-MCNC: 0.7 MG/DL (ref 0.9–1.3)
DEPRECATED RDW RBC AUTO: 12.6 % (ref 12.4–15.4)
EKG ATRIAL RATE: 62 BPM
EKG DIAGNOSIS: NORMAL
EKG P AXIS: 66 DEGREES
EKG P-R INTERVAL: 130 MS
EKG Q-T INTERVAL: 390 MS
EKG QRS DURATION: 98 MS
EKG QTC CALCULATION (BAZETT): 395 MS
EKG R AXIS: 82 DEGREES
EKG T AXIS: 64 DEGREES
EKG VENTRICULAR RATE: 62 BPM
EOSINOPHIL # BLD: 0.3 K/UL (ref 0–0.6)
EOSINOPHIL NFR BLD: 3.6 %
GFR SERPLBLD CREATININE-BSD FMLA CKD-EPI: >60 ML/MIN/{1.73_M2}
GLUCOSE SERPL-MCNC: 141 MG/DL (ref 70–99)
HCT VFR BLD AUTO: 41.1 % (ref 40.5–52.5)
HGB BLD-MCNC: 14.3 G/DL (ref 13.5–17.5)
LYMPHOCYTES # BLD: 3.2 K/UL (ref 1–5.1)
LYMPHOCYTES NFR BLD: 41.5 %
MCH RBC QN AUTO: 30.3 PG (ref 26–34)
MCHC RBC AUTO-ENTMCNC: 34.9 G/DL (ref 31–36)
MCV RBC AUTO: 86.8 FL (ref 80–100)
MONOCYTES # BLD: 0.6 K/UL (ref 0–1.3)
MONOCYTES NFR BLD: 7.5 %
NEUTROPHILS # BLD: 3.5 K/UL (ref 1.7–7.7)
NEUTROPHILS NFR BLD: 46.6 %
PLATELET # BLD AUTO: 203 K/UL (ref 135–450)
PMV BLD AUTO: 8.2 FL (ref 5–10.5)
POTASSIUM SERPL-SCNC: 3.7 MMOL/L (ref 3.5–5.1)
RBC # BLD AUTO: 4.74 M/UL (ref 4.2–5.9)
SODIUM SERPL-SCNC: 137 MMOL/L (ref 136–145)
TROPONIN, HIGH SENSITIVITY: <6 NG/L (ref 0–22)
WBC # BLD AUTO: 7.6 K/UL (ref 4–11)

## 2024-02-28 PROCEDURE — 80048 BASIC METABOLIC PNL TOTAL CA: CPT

## 2024-02-28 PROCEDURE — 6360000002 HC RX W HCPCS: Performed by: EMERGENCY MEDICINE

## 2024-02-28 PROCEDURE — 84484 ASSAY OF TROPONIN QUANT: CPT

## 2024-02-28 PROCEDURE — 36415 COLL VENOUS BLD VENIPUNCTURE: CPT

## 2024-02-28 PROCEDURE — 85025 COMPLETE CBC W/AUTO DIFF WBC: CPT

## 2024-02-28 PROCEDURE — 93005 ELECTROCARDIOGRAM TRACING: CPT | Performed by: EMERGENCY MEDICINE

## 2024-02-28 PROCEDURE — 82550 ASSAY OF CK (CPK): CPT

## 2024-02-28 PROCEDURE — 99285 EMERGENCY DEPT VISIT HI MDM: CPT

## 2024-02-28 PROCEDURE — 71046 X-RAY EXAM CHEST 2 VIEWS: CPT

## 2024-02-28 PROCEDURE — 96374 THER/PROPH/DIAG INJ IV PUSH: CPT

## 2024-02-28 RX ORDER — KETOROLAC TROMETHAMINE 30 MG/ML
15 INJECTION, SOLUTION INTRAMUSCULAR; INTRAVENOUS ONCE
Status: COMPLETED | OUTPATIENT
Start: 2024-02-28 | End: 2024-02-28

## 2024-02-28 RX ADMIN — KETOROLAC TROMETHAMINE 15 MG: 30 INJECTION, SOLUTION INTRAMUSCULAR; INTRAVENOUS at 01:39

## 2024-02-28 ASSESSMENT — ENCOUNTER SYMPTOMS
RESPIRATORY NEGATIVE: 1
GASTROINTESTINAL NEGATIVE: 1
EYES NEGATIVE: 1

## 2024-02-28 ASSESSMENT — PAIN SCALES - GENERAL
PAINLEVEL_OUTOF10: 3
PAINLEVEL_OUTOF10: 7

## 2024-02-28 ASSESSMENT — PAIN DESCRIPTION - ORIENTATION
ORIENTATION: MID
ORIENTATION: MID

## 2024-02-28 ASSESSMENT — PAIN DESCRIPTION - LOCATION
LOCATION: CHEST
LOCATION: CHEST

## 2024-02-28 ASSESSMENT — PAIN DESCRIPTION - PAIN TYPE: TYPE: ACUTE PAIN

## 2024-02-28 ASSESSMENT — PAIN DESCRIPTION - DESCRIPTORS: DESCRIPTORS: SHARP

## 2024-02-28 ASSESSMENT — PAIN - FUNCTIONAL ASSESSMENT
PAIN_FUNCTIONAL_ASSESSMENT: ACTIVITIES ARE NOT PREVENTED
PAIN_FUNCTIONAL_ASSESSMENT: 0-10

## 2024-02-28 NOTE — DISCHARGE INSTRUCTIONS
Your testing in the emergency department did not determine the cause of your chest pain.  Use Tylenol or ibuprofen as needed for pain.  Please continue your current medications and follow-up with your primary care provider as well as your psychiatric nurse practitioner for repeat evaluation and further testing if symptoms continue.

## 2024-02-28 NOTE — ED PROVIDER NOTES
THE [facility]  EMERGENCY DEPARTMENT ENCOUNTER          ATTENDING PHYSICIAN NOTE       Date of evaluation: 2/28/2024    Chief Complaint     Chest Pain (Pt c/o non-radiating mid-sternal chest pain for 2 days. Nitro did not help per EMS. Pt states he recently stopped on of his psychiatric medications and started a new one, is unsure if this is related. States he feels dizzy after the nitro.)      History of Present Illness     Sy Mak is a 33 y.o. adult who presents to the emergency department complaining of chest pain.  Patient states he is not having pain in his chest for 2 days.  He described to nursing that the pain was midsternal but describes to me that the pain is right-sided.  He describes it as sharp and constant over the 2 days.  He states it is worse with certain movements and with exertion.  He does note shortness of breath with the pain and also nausea and vomiting.  He states he has generalized bodyaches as well but denies any fevers or cough.  He states that he is currently being transition from Vraylar to another psychiatric medication and is concerned that this is the cause of his chest pain.  He states he was seen at an outside hospital 2 days ago for the chest pain and had an unremarkable workup.  He states he has been using occasional Tylenol for the pain without improvement.  He was given nitroglycerin by EMS with no change in the chest pain but states he did feel dizzy afterwards.    ASSESSMENT / PLAN  (MEDICAL DECISION MAKING)     INITIAL VITALS: BP: 106/61, Temp: 97.7 °F (36.5 °C), Pulse: 80, Respirations: 18, SpO2: 96 %      Sy Mak is a 33 y.o. adult with history of psychiatric disease presents complaining of chest pain.  Patient notes right-sided chest pain that is been present for the last 2 days.  Patient states that he is concerned this may be a side effect from his Vraylar and he was taken off the medication by his psychiatric nurse practitioner.  On arrival, patient

## 2024-04-06 ENCOUNTER — HOSPITAL ENCOUNTER (EMERGENCY)
Age: 34
Discharge: HOME | End: 2024-04-06
Attending: STUDENT IN AN ORGANIZED HEALTH CARE EDUCATION/TRAINING PROGRAM
Payer: MEDICARE

## 2024-04-06 VITALS
BODY MASS INDEX: 27.78 KG/M2 | TEMPERATURE: 98.4 F | DIASTOLIC BLOOD PRESSURE: 67 MMHG | SYSTOLIC BLOOD PRESSURE: 118 MMHG | HEART RATE: 57 BPM | HEIGHT: 67 IN | RESPIRATION RATE: 17 BRPM | OXYGEN SATURATION: 98 % | WEIGHT: 177 LBS

## 2024-04-06 DIAGNOSIS — K40.90 NON-RECURRENT UNILATERAL INGUINAL HERNIA WITHOUT OBSTRUCTION OR GANGRENE: Primary | ICD-10-CM

## 2024-04-06 PROCEDURE — 99282 EMERGENCY DEPT VISIT SF MDM: CPT

## 2024-04-06 ASSESSMENT — PAIN DESCRIPTION - PAIN TYPE
TYPE: ACUTE PAIN
TYPE: ACUTE PAIN

## 2024-04-06 ASSESSMENT — PAIN SCALES - GENERAL
PAINLEVEL_OUTOF10: 4
PAINLEVEL_OUTOF10: 8

## 2024-04-06 ASSESSMENT — PAIN DESCRIPTION - DESCRIPTORS
DESCRIPTORS: BURNING;SHARP
DESCRIPTORS: DISCOMFORT

## 2024-04-06 ASSESSMENT — PAIN DESCRIPTION - LOCATION
LOCATION: GROIN
LOCATION: GROIN

## 2024-04-06 ASSESSMENT — PAIN DESCRIPTION - ONSET: ONSET: PROGRESSIVE

## 2024-04-06 ASSESSMENT — PAIN DESCRIPTION - FREQUENCY: FREQUENCY: CONTINUOUS

## 2024-04-06 ASSESSMENT — PAIN DESCRIPTION - ORIENTATION: ORIENTATION: RIGHT

## 2024-04-06 ASSESSMENT — PAIN - FUNCTIONAL ASSESSMENT
PAIN_FUNCTIONAL_ASSESSMENT: 0-10
PAIN_FUNCTIONAL_ASSESSMENT: 0-10

## 2024-04-06 NOTE — ED PROVIDER NOTES
THE [facility]  EMERGENCY DEPARTMENT ENCOUNTER          [facility] RESIDENT NOTE       Date of evaluation: 4/6/2024    Chief Complaint     Groin Pain (Right groin with some pain and swelling worried he may have a hernia)      History of Present Illness     Sy Mak is a 33 y.o. adult who presents with right sided groin pain which started about a week and a half ago. They have intermitted sharp pain that radiates to the RLQ. States that has some swelling with coughing and is worried may have hernia. They is sexually active with one female partner. Denies any dysuria, suprapubic pain, testicular pain, fever, chills or any other complaints.     ASSESSMENT / PLAN  (MEDICAL DECISION MAKING)     INITIAL VITALS: BP: 118/67, Temp: 98.4 °F (36.9 °C), Pulse: 57, Respirations: 17, SpO2: 98 %     Sy Mak is a 33 y.o. adult who presents with  right sided groin pain and swelling with cough. They were worried for hernia. Denies any dysuria, testicular pain, fever, chills. On my evaluation, non incarcerated, non strangulated hernia palpated in the right side. Will discharge with Surgical referral.     Is this patient to be included in the SEP-1 core measure? No Exclusion criteria - the patient is NOT to be included for SEP-1 Core Measure due to: Infection is not suspected    Medical Decision Making  Problems Addressed:  Non-recurrent unilateral inguinal hernia without obstruction or gangrene: acute illness or injury that poses a threat to life or bodily functions    Risk  OTC drugs.        This patient was also evaluated by the attending physician. All care plans were discussed and agreed upon.    Clinical Impression     1. Non-recurrent unilateral inguinal hernia without obstruction or gangrene        Disposition     PATIENT REFERRED TO:  The [facility] Emergency Department  [address] [place] [id number]  [phone]  Call   If symptoms worsen    Montana Chopra MD  [id number]

## 2024-04-06 NOTE — ED PROVIDER NOTES
ED Attending Attestation Note     Date of evaluation: 4/6/2024    I have discussed the case with the resident. I have personally performed a history, physical exam, and my own medical decision making. I have reviewed the note and agree with the findings and plan. My assessment reveals a well-appearing young nonbinary 33-year-old person presenting with pelvic bulging and pain.  They feel like when they are singing or straining they feel a bulge on the right side of the pelvis.  They are concerned they have a hernia.  No testicular, scrotal, or penile pain.  No dysuria or hematuria.  On evaluation, the patient has a freely moving inguinal hernia.  They were discharged home with a referral to surgery and strict return precautions.       Rommel Tian MD  04/06/24 0670

## 2024-04-06 NOTE — ED NOTES
Patient discharged to home via family.  Written discharge instructions reviewed with understanding.  Copy of AVS sent home with patient. Patient able to walk from ED without assistance.       Rachna Escoto RN  04/06/24 [id number]

## 2024-04-09 ENCOUNTER — HOSPITAL ENCOUNTER (EMERGENCY)
Age: 34
Discharge: LEFT BEFORE DISCHARGE | End: 2024-04-09
Attending: EMERGENCY MEDICINE

## 2024-04-10 LAB
REASON FOR REJECTION: NORMAL
REJECTED TEST: NORMAL

## 2024-04-10 NOTE — ED NOTES
Patients name was called three times. Attempted to located patient, patient unable to be found     Shivam Tobin RN  04/09/24 2002

## 2024-04-26 ENCOUNTER — HOSPITAL ENCOUNTER (EMERGENCY)
Age: 34
Discharge: HOME | End: 2024-04-26
Attending: EMERGENCY MEDICINE
Payer: MEDICARE

## 2024-04-26 VITALS
HEIGHT: 67 IN | HEART RATE: 69 BPM | RESPIRATION RATE: 18 BRPM | DIASTOLIC BLOOD PRESSURE: 80 MMHG | WEIGHT: 175 LBS | BODY MASS INDEX: 27.47 KG/M2 | SYSTOLIC BLOOD PRESSURE: 106 MMHG | OXYGEN SATURATION: 96 % | TEMPERATURE: 97.8 F

## 2024-04-26 DIAGNOSIS — K40.90 NON-RECURRENT UNILATERAL INGUINAL HERNIA WITHOUT OBSTRUCTION OR GANGRENE: Primary | ICD-10-CM

## 2024-04-26 PROCEDURE — 99283 EMERGENCY DEPT VISIT LOW MDM: CPT

## 2024-04-26 RX ORDER — OXYCODONE HYDROCHLORIDE AND ACETAMINOPHEN 5; 325 MG/1; MG/1
1 TABLET ORAL EVERY 6 HOURS PRN
Qty: 10 TABLET | Refills: 0 | Status: SHIPPED | OUTPATIENT
Start: 2024-04-26 | End: 2024-04-29

## 2024-04-26 ASSESSMENT — PAIN DESCRIPTION - ORIENTATION: ORIENTATION: RIGHT

## 2024-04-26 ASSESSMENT — PAIN DESCRIPTION - LOCATION: LOCATION: GROIN

## 2024-04-26 ASSESSMENT — PAIN DESCRIPTION - DESCRIPTORS: DESCRIPTORS: BURNING;STABBING

## 2024-04-26 ASSESSMENT — PAIN - FUNCTIONAL ASSESSMENT: PAIN_FUNCTIONAL_ASSESSMENT: 0-10

## 2024-04-26 ASSESSMENT — PAIN SCALES - GENERAL: PAINLEVEL_OUTOF10: 8

## 2024-04-26 NOTE — ED PROVIDER NOTES
PM        Diagnostic Results and Other Data       RADIOLOGY:  No orders to display       LABS:   No results found for this visit on 04/26/24.      ED BEDSIDE ULTRASOUND:  No results found.    MOST RECENT VITALS:  BP: 106/80,Temp: 97.8 °F (36.6 °C), Pulse: 69, Respirations: 18, SpO2: 96 %     Procedures       ED Course     Nursing Notes, Past Medical Hx, Past Surgical Hx, Social Hx,Allergies, and Family Hx were reviewed.         The patient was given the following medications:  Orders Placed This Encounter   Medications    oxyCODONE-acetaminophen (PERCOCET) 5-325 MG per tablet     Sig: Take 1 tablet by mouth every 6 hours as needed for Pain for up to 3 days. Intended supply: 3 days. Take lowest dose possible to manage pain Max Daily Amount: 4 tablets     Dispense:  10 tablet     Refill:  0       CONSULTS:  None    Review of Systems     Review of Systems    Past Medical, Surgical, Family, and Social History     Sy Moreno" has a past medical history of Brain cyst, GERD (gastroesophageal reflux disease), History of seizures, and Hypertension.  Sy Moreno" has a past surgical history that includes Cholecystectomy.  Sy Moreno"'s family history is not on file.  Sy Moreno" reports that Sy Moreno" has quit smoking. Sy Moreno"'s smoking use included cigarettes. Sy Moreno" has never used smokeless tobacco. Sy Moreno" reports that Sy Moreno" does not currently use alcohol. Sy Moreno" reports current drug use. Drug: Marijuana (Weed).    Medications     Previous Medications    FLUOXETINE (PROZAC) 20 MG CAPSULE    Take 20 mg by mouth daily Take 3 capsules daily.    LAMOTRIGINE (LAMICTAL) 100 MG TABLET    Take 125 mg by mouth daily    PANTOPRAZOLE (PROTONIX) 40 MG TABLET    Take 1 tablet by mouth daily    PRAZOSIN (MINIPRESS) 1 MG CAPSULE    Take 1 mg by mouth nightly    PROPRANOLOL (INDERAL) 10 MG TABLET    Take 10 mg by mouth 3

## 2024-04-27 NOTE — ED NOTES
AAOx4, NAD, resp e/u on RA, ambulatory gait steady, d/c with paperwork and printed prescription.     Filerman, Brandon, RN  04/26/24 2008

## 2024-11-27 ENCOUNTER — APPOINTMENT (OUTPATIENT)
Dept: CT IMAGING | Age: 34
End: 2024-11-27
Payer: MEDICARE

## 2024-11-27 ENCOUNTER — HOSPITAL ENCOUNTER (EMERGENCY)
Age: 34
Discharge: HOME | End: 2024-11-27
Attending: EMERGENCY MEDICINE
Payer: MEDICARE

## 2024-11-27 VITALS
OXYGEN SATURATION: 94 % | SYSTOLIC BLOOD PRESSURE: 112 MMHG | HEIGHT: 67 IN | TEMPERATURE: 98.3 F | BODY MASS INDEX: 30.07 KG/M2 | DIASTOLIC BLOOD PRESSURE: 70 MMHG | RESPIRATION RATE: 18 BRPM | WEIGHT: 191.6 LBS | HEART RATE: 64 BPM

## 2024-11-27 DIAGNOSIS — R10.31 RIGHT INGUINAL PAIN: Primary | ICD-10-CM

## 2024-11-27 LAB
ALBUMIN SERPL-MCNC: 4.4 G/DL (ref 3.4–5)
ALP SERPL-CCNC: 99 U/L (ref 40–129)
ALT SERPL-CCNC: 17 U/L (ref 10–40)
ANION GAP SERPL CALCULATED.3IONS-SCNC: 10 MMOL/L (ref 3–16)
AST SERPL-CCNC: 16 U/L (ref 15–37)
BASOPHILS # BLD: 0.1 K/UL (ref 0–0.2)
BASOPHILS NFR BLD: 1.3 %
BILIRUB DIRECT SERPL-MCNC: <0.1 MG/DL (ref 0–0.3)
BILIRUB INDIRECT SERPL-MCNC: 0.2 MG/DL (ref 0–1)
BILIRUB SERPL-MCNC: 0.3 MG/DL (ref 0–1)
BUN SERPL-MCNC: 8 MG/DL (ref 7–20)
CALCIUM SERPL-MCNC: 9.6 MG/DL (ref 8.3–10.6)
CHLORIDE SERPL-SCNC: 105 MMOL/L (ref 99–110)
CO2 SERPL-SCNC: 26 MMOL/L (ref 21–32)
CREAT SERPL-MCNC: 0.8 MG/DL (ref 0.9–1.3)
DEPRECATED RDW RBC AUTO: 12.8 % (ref 12.4–15.4)
EOSINOPHIL # BLD: 0.6 K/UL (ref 0–0.6)
EOSINOPHIL NFR BLD: 7.8 %
GFR SERPLBLD CREATININE-BSD FMLA CKD-EPI: >90 ML/MIN/{1.73_M2}
GLUCOSE SERPL-MCNC: 92 MG/DL (ref 70–99)
HCT VFR BLD AUTO: 44.9 % (ref 40.5–52.5)
HGB BLD-MCNC: 14.9 G/DL (ref 13.5–17.5)
LIPASE SERPL-CCNC: 23 U/L (ref 13–60)
LYMPHOCYTES # BLD: 2.4 K/UL (ref 1–5.1)
LYMPHOCYTES NFR BLD: 31.6 %
MCH RBC QN AUTO: 29.8 PG (ref 26–34)
MCHC RBC AUTO-ENTMCNC: 33.3 G/DL (ref 31–36)
MCV RBC AUTO: 89.5 FL (ref 80–100)
MONOCYTES # BLD: 0.6 K/UL (ref 0–1.3)
MONOCYTES NFR BLD: 7.7 %
NEUTROPHILS # BLD: 4 K/UL (ref 1.7–7.7)
NEUTROPHILS NFR BLD: 51.6 %
PLATELET # BLD AUTO: 238 K/UL (ref 135–450)
PMV BLD AUTO: 8.4 FL (ref 5–10.5)
POTASSIUM SERPL-SCNC: 4.2 MMOL/L (ref 3.5–5.1)
PROT SERPL-MCNC: 7.2 G/DL (ref 6.4–8.2)
RBC # BLD AUTO: 5.02 M/UL (ref 4.2–5.9)
SODIUM SERPL-SCNC: 141 MMOL/L (ref 136–145)
WBC # BLD AUTO: 7.7 K/UL (ref 4–11)

## 2024-11-27 PROCEDURE — 80048 BASIC METABOLIC PNL TOTAL CA: CPT

## 2024-11-27 PROCEDURE — 83690 ASSAY OF LIPASE: CPT

## 2024-11-27 PROCEDURE — 99285 EMERGENCY DEPT VISIT HI MDM: CPT

## 2024-11-27 PROCEDURE — 85025 COMPLETE CBC W/AUTO DIFF WBC: CPT

## 2024-11-27 PROCEDURE — 80076 HEPATIC FUNCTION PANEL: CPT

## 2024-11-27 PROCEDURE — 96374 THER/PROPH/DIAG INJ IV PUSH: CPT

## 2024-11-27 PROCEDURE — 74177 CT ABD & PELVIS W/CONTRAST: CPT

## 2024-11-27 PROCEDURE — 6360000004 HC RX CONTRAST MEDICATION: Performed by: EMERGENCY MEDICINE

## 2024-11-27 PROCEDURE — 6360000002 HC RX W HCPCS: Performed by: EMERGENCY MEDICINE

## 2024-11-27 RX ORDER — KETOROLAC TROMETHAMINE 30 MG/ML
15 INJECTION, SOLUTION INTRAMUSCULAR; INTRAVENOUS ONCE
Status: COMPLETED | OUTPATIENT
Start: 2024-11-27 | End: 2024-11-27

## 2024-11-27 RX ORDER — IOPAMIDOL 755 MG/ML
75 INJECTION, SOLUTION INTRAVASCULAR
Status: COMPLETED | OUTPATIENT
Start: 2024-11-27 | End: 2024-11-27

## 2024-11-27 RX ADMIN — IOPAMIDOL 75 ML: 755 INJECTION, SOLUTION INTRAVENOUS at 14:46

## 2024-11-27 RX ADMIN — KETOROLAC TROMETHAMINE 15 MG: 30 INJECTION INTRAMUSCULAR; INTRAVENOUS at 13:37

## 2024-11-27 ASSESSMENT — PAIN SCALES - GENERAL
PAINLEVEL_OUTOF10: 5
PAINLEVEL_OUTOF10: 8
PAINLEVEL_OUTOF10: 7

## 2024-11-27 ASSESSMENT — PAIN DESCRIPTION - LOCATION
LOCATION: ABDOMEN
LOCATION: ABDOMEN

## 2024-11-27 ASSESSMENT — LIFESTYLE VARIABLES
HOW OFTEN DO YOU HAVE A DRINK CONTAINING ALCOHOL: NEVER
HOW MANY STANDARD DRINKS CONTAINING ALCOHOL DO YOU HAVE ON A TYPICAL DAY: PATIENT DOES NOT DRINK

## 2024-11-27 NOTE — DISCHARGE INSTRUCTIONS
Take ibuprofen 600 mg 3 times a day as needed for pain.  Additionally, can take at 1000 mg of acetaminophen up to 3 times a day in combination with ibuprofen.    Drink plenty of water, 2 to 3 L a day.  Recommend taking MiraLAX as needed for regular bowel movements.    Please follow-up with your surgeon.  You can return to the emergency department for new or worsening symptoms.

## 2024-11-27 NOTE — ED PROVIDER NOTES
THE [facility]  EMERGENCY DEPARTMENT ENCOUNTER          ATTENDING PHYSICIAN NOTE       Date of evaluation: 11/27/2024    Chief Complaint     Abdominal Pain (Hx R inguinal hernia w/ repair, pt report 2 days ago feeling like hernia popped out w/ noticeable swelling pt able to reduce)      History of Present Illness     Sy Mak is a 34 y.o. adult who presents who is status post right inguinal hernia repair earlier this year who presents with right inguinal pain.  This has been going on essentially since the surgery.  Saw surgeon a couple weeks ago who ordered an outpatient CAT scan.  States that the pain is worsened in the past several days.  He felt that there was a bulging area yesterday that he was able to reduce.  No pain in his testicles.  No dysuria.  No constipation.    ASSESSMENT / PLAN  (MEDICAL DECISION MAKING)     INITIAL VITALS: BP: 124/80, Temp: 98.3 °F (36.8 °C), Pulse: 64, Respirations: 18, SpO2: 98 %        Medical Decision Making  Problems Addressed:  Right inguinal pain: acute illness or injury     Details: History of prior repair of right inguinal hernia.  I do not appreciate a hernia on examination.  Is supposed to get a CT scan as an outpatient.  Reports that he had a bulge yesterday.  Will get CT scan but my suspicion for recurrent hernia is actually fairly low. Turned over to Dr. Hathaway.    Amount and/or Complexity of Data Reviewed  Labs: ordered.  Radiology: ordered.    Risk  Prescription drug management.        Clinical Impression     1. Right inguinal pain        Disposition     PATIENT REFERRED TO:  No follow-up provider specified.    DISCHARGE MEDICATIONS:  New Prescriptions    No medications on file       DISPOSITION               Diagnostic Results and Other Data       RADIOLOGY:  CT ABDOMEN PELVIS W IV CONTRAST Additional Contrast? None    (Results Pending)       LABS:   Results for orders placed or performed during the hospital encounter of 11/27/24   CBC with Auto

## 2024-11-27 NOTE — ED PROVIDER NOTES
THE [facility]  EMERGENCY DEPARTMENT ENCOUNTER          ATTENDING PHYSICIAN NOTE       Date of evaluation: 11/27/2024    ADDENDUM:      Care of this patient was assumed from Dr. Maynard.  The patient was seen for Abdominal Pain (Hx R inguinal hernia w/ repair, pt report 2 days ago feeling like hernia popped out w/ noticeable swelling pt able to reduce)  .  The patient's initial evaluation and plan have been discussed with the prior provider who initially evaluated the patient.  Nursing Notes, Past Medical Hx, Past Surgical Hx, Social Hx, Allergies, and Family Hx were all reviewed.    ASSESSMENT / PLAN  (MEDICAL DECISION MAKING)     Sy Mak is a 34 y.o. adult presenting with abdominal pain.  On signout to me, awaiting workup.  CBC with normal white count of 7.7 and hemoglobin of 14.9.  Renal panel with normal electrolytes and renal function.  Lipase normal.  LFTs normal.    CT abdomen and pelvis with diverticulosis but no acute findings.    I advised the patient of the diverticulosis findings and recommended fiber in his diet.  He does not require anything for nausea.  Recommend ibuprofen and acetaminophen for pain at home.  Recommend MiraLAX as needed for regular bowel movements.  Recommend following up with his surgeon.    Is this patient to be included in the SEP-1 core measure? No Exclusion criteria - the patient is NOT to be included for SEP-1 Core Measure due to: Infection is not suspected    Medical Decision Making  Amount and/or Complexity of Data Reviewed  Labs: ordered.  Radiology: ordered.    Risk  Prescription drug management.        Clinical Impression     1. Right inguinal pain        Disposition     PATIENT REFERRED TO:  No follow-up provider specified.    DISCHARGE MEDICATIONS:  New Prescriptions    No medications on file       DISPOSITION Decision To Discharge 11/27/2024 04:19:57 PM             Diagnostic Results and Other Data                                   RADIOLOGY:  CT ABDOMEN PELVIS

## 2025-05-23 ENCOUNTER — APPOINTMENT (OUTPATIENT)
Dept: GENERAL RADIOLOGY | Age: 35
End: 2025-05-23
Payer: MEDICARE

## 2025-05-23 ENCOUNTER — HOSPITAL ENCOUNTER (EMERGENCY)
Age: 35
Discharge: HOME | End: 2025-05-23
Attending: EMERGENCY MEDICINE
Payer: MEDICARE

## 2025-05-23 VITALS
BODY MASS INDEX: 29.22 KG/M2 | SYSTOLIC BLOOD PRESSURE: 131 MMHG | DIASTOLIC BLOOD PRESSURE: 80 MMHG | HEIGHT: 67 IN | HEART RATE: 52 BPM | TEMPERATURE: 98.2 F | OXYGEN SATURATION: 100 % | WEIGHT: 186.2 LBS | RESPIRATION RATE: 18 BRPM

## 2025-05-23 DIAGNOSIS — R07.9 CHEST PAIN, UNSPECIFIED TYPE: Primary | ICD-10-CM

## 2025-05-23 LAB
EKG ATRIAL RATE: 47 BPM
EKG DIAGNOSIS: NORMAL
EKG P AXIS: 42 DEGREES
EKG P-R INTERVAL: 124 MS
EKG Q-T INTERVAL: 408 MS
EKG QRS DURATION: 100 MS
EKG QTC CALCULATION (BAZETT): 361 MS
EKG R AXIS: 43 DEGREES
EKG T AXIS: 44 DEGREES
EKG VENTRICULAR RATE: 47 BPM

## 2025-05-23 PROCEDURE — 93005 ELECTROCARDIOGRAM TRACING: CPT

## 2025-05-23 PROCEDURE — 96374 THER/PROPH/DIAG INJ IV PUSH: CPT

## 2025-05-23 PROCEDURE — 6360000002 HC RX W HCPCS

## 2025-05-23 PROCEDURE — 71046 X-RAY EXAM CHEST 2 VIEWS: CPT

## 2025-05-23 PROCEDURE — 99284 EMERGENCY DEPT VISIT MOD MDM: CPT

## 2025-05-23 RX ORDER — KETOROLAC TROMETHAMINE 30 MG/ML
15 INJECTION, SOLUTION INTRAMUSCULAR; INTRAVENOUS ONCE
Status: COMPLETED | OUTPATIENT
Start: 2025-05-23 | End: 2025-05-23

## 2025-05-23 RX ADMIN — KETOROLAC TROMETHAMINE 15 MG: 30 INJECTION, SOLUTION INTRAMUSCULAR at 00:52

## 2025-05-23 ASSESSMENT — PAIN DESCRIPTION - LOCATION: LOCATION: CHEST

## 2025-05-23 ASSESSMENT — PAIN DESCRIPTION - DESCRIPTORS: DESCRIPTORS: DULL

## 2025-05-23 ASSESSMENT — PAIN DESCRIPTION - ORIENTATION: ORIENTATION: RIGHT

## 2025-05-23 ASSESSMENT — LIFESTYLE VARIABLES
HOW MANY STANDARD DRINKS CONTAINING ALCOHOL DO YOU HAVE ON A TYPICAL DAY: PATIENT DOES NOT DRINK
HOW OFTEN DO YOU HAVE A DRINK CONTAINING ALCOHOL: NEVER

## 2025-05-23 ASSESSMENT — PAIN SCALES - GENERAL: PAINLEVEL_OUTOF10: 5

## 2025-05-23 NOTE — DISCHARGE INSTRUCTIONS
You were seen in the Emergency Department with chest pain. Your EKG did not show any evidence of a heart attack today. Your chest x-ray was normal. Often, we cannot exactly pinpoint what is causing your chest pain, but we do not believe that anything dangerous is causing your symptoms today. For ongoing pain at home, you can take acetaminophen (Tylenol) 1000 mg and ibuprofen (Advil, Aleve, Motrin) 600 mg every 6 hours as needed, or alternating every 3 hours (e.g., Tylenol at 12 PM, ibuprofen at 3 PM, Tylenol at 6 PM, ibuprofen at 9 PM, and so on). Do not take these medications for more than 1 week without consulting your primary care doctor.    We recommend that you follow-up with your primary care provider in the next 7 days. If you do not have a primary care provider, please contact your insurance provider to establish care with a provider or call [facility]'s central scheduling department at [phone] to schedule an appointment with a [facility] provider. You should discuss with your primary care provider whether or not you need a referral to a cardiologist for consideration of a stress test.     Reasons to return to the Emergency Department:  - You develop new, worse, or different chest pain  - You develop chest pain that is worse when exerting yourself  - You develop nausea, vomiting, or sweating with chest pain  - You develop shortness of breath or difficulty breathing  - You develop lightheadedness or pass out  - You develop a fever (greater than 100.4°F)   - Or you have any other concerns    Future Appointments   Date Time Provider Department Center   6/6/2025  1:20 PM Tiarra Davis MD [facility] DEP

## 2025-05-23 NOTE — ED PROVIDER NOTES
ED Attending Attestation Note     Date of evaluation: 5/23/2025    This patient was seen by the resident.  I have seen and examined the patient, agree with the workup, evaluation, management and diagnosis. The care plan has been discussed.  My assessment reveals patient with h/o GERD here with chest pain in the right upper chest.   No SOB.  HR 59.   PERC negative.  CW somewhat tender, c/w MSK pain.  Toradol administered with relief and CXR/ECG unremarkable.  Will manage as PORSCHE CP.     Sy Dolan MD  05/23/25 0227

## 2025-05-23 NOTE — ED NOTES
Pt discharged from ED in stable condition. Discharge instruction explain, all question answered.  Pt walked to lobby independently.       Rogelio Christine, RN  05/23/25 4931

## 2025-05-23 NOTE — ED PROVIDER NOTES
THE [facility]  EMERGENCY DEPARTMENT ENCOUNTER          EM RESIDENT NOTE       Date of evaluation: 2025    Chief Complaint     Chest Pain (Patient brought in by squad as a case of chest pain started an hour ago. 324 mg aspirin given en route.)      History of Present Illness     Sy Mak is a 34 y.o. adult with hx of GERD and hiatal hernia presenting to the ED c/o CP.  Pt describes \"heavy\" CP that started while he was bearing down to have a BM.  Now pain worse with moving.  Not similar to previous GERD.  No fevers, no chills, no HTN, HLD, DM, family hx of early CAD.    Other than stated above, no additional associated symptoms or aggravating or alleviating factors are noted    MEDICAL DECISION MAKING / ASSESSMENT / PLAN     INITIAL VITALS: BP: 131/80, Temp: 98.2 °F (36.8 °C), Pulse: 52, Respirations: 18, SpO2: 100 %    Sy Mak is a 34 y.o. adult with a history and presentation as described above in HPI.  The patient was evaluated by myself and the ED Attending Physician,  Dr. Dolan . All management and disposition plans were discussed and agreed upon.    Upon presentation, the patient was Well-appearing, afebrile and hemodynamically stable.    Pt was seen and evaluated in room 7, my initial evaluation reveals 35 y/o with no cardiac risk factors presenting c/o CP.    Based on clinical hx low suspicion for cardiac etiology.  EKG not consistent with STEMI or pericarditis.  No PE risk factors.    Age greater or equal to 50: 0  HR greater or equal to 100: 0  O2 sat on room air <95%: 0  Unilateral leg swellin  Hemoptysis: 0  Recent surgery or trauma-Surgery or trauma =4 weeks ago requiring treatment with general anesthesia: 0  Prior PE or DVT: 0  Hormone use-Oral contraceptives, hormone replacement or estrogenic hormones use in males or female patients: 0  PERC Score Total: 0      Patient pain treated with IV toradol.  Suspect costochondiritis.  With benign EKG and CXR without acute process  will DC home.        Is this patient to be included in the SEP-1 core measure? No Exclusion criteria - the patient is NOT to be included for SEP-1 Core Measure due to: Infection is not suspected    Medical Decision Making  Problems Addressed:  Chest pain, unspecified type: acute illness or injury    Amount and/or Complexity of Data Reviewed  Radiology: ordered.    Risk  Prescription drug management.        This patient was also evaluated by the attending physician. All care plans were discussed and agreed upon.    Clinical Impression     1. Chest pain, unspecified type         Disposition     PATIENT REFERRED TO:  No follow-up provider specified.    DISCHARGE MEDICATIONS:  New Prescriptions    No medications on file       DISPOSITION                 At this time, the patient was deemed appropriate for discharge. My customary discharge instructions, including strict return precautions for new or worsening symptoms concerning to the patient, were provided. All of patient's questions were answered satisfactorily, and Yehuda was subsequently sent home in stable condition.      Diagnostic Results and Other Data     RADIOLOGY:  XR CHEST (2 VW)    (Results Pending)       LABS:   No results found for this visit on 05/23/25.    EKG   Interpreted in conjunction with emergencydepartment physician Sy Dolan MD  Indication: chest pain  Sinus bradycardia rate of 47, nl intervals, nl axis, no STEMI.        ED BEDSIDE ULTRASOUND:  No results found.    RECENT VITALS:  BP: 131/80, Temp: 98.2 °F (36.8 °C), Pulse: 52,Respirations: 18, SpO2: 100 %     Procedures     Procedures      ED Course     Nursing Notes, Past Medical Hx, Past Surgical Hx, Social Hx, Allergies, and Family Hx were reviewed.         The patient was given the following medications:  Orders Placed This Encounter   Medications    ketorolac (TORADOL) injection 15 mg       CONSULTS:  None    Review of Systems     Review of Systems  All other systems are negative